# Patient Record
Sex: FEMALE | Race: WHITE | NOT HISPANIC OR LATINO | Employment: FULL TIME | URBAN - METROPOLITAN AREA
[De-identification: names, ages, dates, MRNs, and addresses within clinical notes are randomized per-mention and may not be internally consistent; named-entity substitution may affect disease eponyms.]

---

## 2017-01-16 LAB — HBA1C MFR BLD HPLC: 5.7 %

## 2018-04-12 DIAGNOSIS — E03.9 HYPOTHYROIDISM, UNSPECIFIED TYPE: Primary | ICD-10-CM

## 2018-04-12 RX ORDER — LEVOTHYROXINE SODIUM 175 MCG
175 TABLET ORAL DAILY
COMMUNITY
End: 2018-04-12 | Stop reason: SDUPTHER

## 2018-04-12 RX ORDER — LEVOTHYROXINE SODIUM 175 MCG
175 TABLET ORAL DAILY
Qty: 105 TABLET | Refills: 0 | Status: SHIPPED | OUTPATIENT
Start: 2018-04-12 | End: 2021-11-17 | Stop reason: SDUPTHER

## 2018-06-26 ENCOUNTER — TELEPHONE (OUTPATIENT)
Dept: ENDOCRINOLOGY | Facility: HOSPITAL | Age: 53
End: 2018-06-26

## 2018-06-26 NOTE — TELEPHONE ENCOUNTER
Called pt and advised we requested [place] chart from [facility] and put a rush on it  As soon as we have records we will fax to [address] [place] [address]

## 2018-06-26 NOTE — TELEPHONE ENCOUNTER
Pt called questioning if we received release of information from [address] [place] [address]  I advised as far as I could tell we did not receive  She said they faxed several weeks ago  Pt has appt 6/28/18  I called [facility] and left message asking them to fax again    [phone]

## 2018-06-26 NOTE — TELEPHONE ENCOUNTER
Called back to [address] [place] [address] advised that release was sent to central fax   Advised her to send to direct fax #

## 2020-07-01 LAB — EXT SARS-COV-2: NOT DETECTED

## 2021-07-20 ENCOUNTER — OFFICE VISIT (OUTPATIENT)
Dept: ENDOCRINOLOGY | Facility: HOSPITAL | Age: 56
End: 2021-07-20
Payer: COMMERCIAL

## 2021-07-20 VITALS
SYSTOLIC BLOOD PRESSURE: 126 MMHG | WEIGHT: 205.4 LBS | HEART RATE: 78 BPM | BODY MASS INDEX: 32.24 KG/M2 | HEIGHT: 67 IN | DIASTOLIC BLOOD PRESSURE: 82 MMHG

## 2021-07-20 DIAGNOSIS — E53.8 VITAMIN B12 DEFICIENCY: ICD-10-CM

## 2021-07-20 DIAGNOSIS — E55.9 VITAMIN D DEFICIENCY: ICD-10-CM

## 2021-07-20 DIAGNOSIS — E06.3 HYPOTHYROIDISM DUE TO HASHIMOTO'S THYROIDITIS: Primary | ICD-10-CM

## 2021-07-20 DIAGNOSIS — E03.8 HYPOTHYROIDISM DUE TO HASHIMOTO'S THYROIDITIS: Primary | ICD-10-CM

## 2021-07-20 DIAGNOSIS — E28.2 PCOS (POLYCYSTIC OVARIAN SYNDROME): ICD-10-CM

## 2021-07-20 DIAGNOSIS — E27.8 LEFT ADRENAL MASS (HCC): ICD-10-CM

## 2021-07-20 PROCEDURE — 99245 OFF/OP CONSLTJ NEW/EST HI 55: CPT | Performed by: INTERNAL MEDICINE

## 2021-07-20 RX ORDER — LEVONORGESTREL / ETHINYL ESTRADIOL AND ETHINYL ESTRADIOL 150-30(84)
KIT ORAL
COMMUNITY
Start: 2021-04-30 | End: 2022-05-19 | Stop reason: ALTCHOICE

## 2021-07-20 RX ORDER — DEXAMETHASONE 1 MG
TABLET ORAL
Qty: 1 TABLET | Refills: 0 | Status: SHIPPED | OUTPATIENT
Start: 2021-07-20 | End: 2021-11-17 | Stop reason: ALTCHOICE

## 2021-07-20 RX ORDER — FLUOXETINE HYDROCHLORIDE 40 MG/1
40 CAPSULE ORAL DAILY
COMMUNITY
Start: 2021-07-07

## 2021-07-20 NOTE — PROGRESS NOTES
7/21/2021    Assessment/Plan      Diagnoses and all orders for this visit:    Hypothyroidism due to Hashimoto's thyroiditis  -     TSH, 3rd generation Lab Collect; Future  -     T4, free Lab Collect; Future  -     Vitamin D 25 hydroxy Lab Collect; Future  -     Vitamin B12; Future  -     Catecholamines, fractionated, plasma; Future  -     Metanephrine, Fractionated Plasma Free; Future    PCOS (polycystic ovarian syndrome)  -     TSH, 3rd generation Lab Collect; Future  -     T4, free Lab Collect; Future  -     Vitamin D 25 hydroxy Lab Collect; Future  -     Vitamin B12; Future  -     Catecholamines, fractionated, plasma; Future  -     Metanephrine, Fractionated Plasma Free; Future    Vitamin D deficiency  -     TSH, 3rd generation Lab Collect; Future  -     T4, free Lab Collect; Future  -     Vitamin D 25 hydroxy Lab Collect; Future  -     Vitamin B12; Future  -     Catecholamines, fractionated, plasma; Future  -     Metanephrine, Fractionated Plasma Free; Future    Vitamin B12 deficiency  -     TSH, 3rd generation Lab Collect; Future  -     T4, free Lab Collect; Future  -     Vitamin D 25 hydroxy Lab Collect; Future  -     Vitamin B12; Future  -     Catecholamines, fractionated, plasma; Future  -     Metanephrine, Fractionated Plasma Free; Future    Left adrenal mass (HCC)  -     TSH, 3rd generation Lab Collect; Future  -     T4, free Lab Collect; Future  -     Vitamin D 25 hydroxy Lab Collect; Future  -     Vitamin B12; Future  -     Catecholamines, fractionated, plasma; Future  -     Metanephrine, Fractionated Plasma Free; Future  -     Cortisol Level, AM Specimen Lab Collect; Future  -     dexamethasone (DECADRON) 1 mg tablet; Take 1 mg at 10-11 pm the night before the fasting am cortisol test is drawn    Other orders  -     FLUoxetine (PROzac) 40 MG capsule; Take 40 mg by mouth daily  -     Levonorgest-Eth Estrad 91-Day 0 15-0 03 &0 01 MG TABS; 1 TABLET ONCE A DAY ORAL 365 DAY(S)        Assessment/Plan:  1  Hypothyroidism due to Hashimoto's thyroiditis  She recently had a decrease in TSH and thyroid hormone dosage was adjusted  Blood work was done just about 1 month after the dosage adjustment which is a bit too soon for the hypothalamic-pituitary -thyroid axis to recall a break  I would not adjust her Synthroid dosage at this point  She is due for thyroid hormone re-evaluation 6-8 weeks after her dosage was adjusted and that is within the next week or 2  I will repeat a TSH with free T4   In the future, I will repeat her thyroid antibody levels         2  Polycystic ovary syndrome  She is only using oral contraceptives  At some point, we could do a CMP with fasting insulin level to determine if metformin is an option  3  Left adrenal mass  Given her history of a left adrenal mass and struggles with weight, I will check an overnight dexamethasone suppression test to rule out cortisol excess and metanephrines and catecholamines to rule out pheochromocytoma  4  Vitamin-D deficiency  She has not had this checked recently and I will do a vitamin-D level  5  Vitamin B12 deficiency  She has not had this checked recently and I will do a vitamin  B12 level  She will get blood work done in the next week consisting of a TSH with free T4, catecholamines, metanephrines, vitamin B12 level, and 25 hydroxy vitamin-D level along with an overnight dexamethasone suppression test     Follow-up will be determined based on the blood work  CC:   Hypothyroid, PCOS, adrenal consult    History of Present Illness     HPI: Hanna Glez is a 55y o  year old female with history of  Hypothyroidism due to Hashimoto's thyroiditis, polycystic ovary syndrome, vitamin B12 deficiency, vitamin-D deficiency, left adrenal mass for evaluation/consult  She was diagnosed with hypothyroidism due to Hashimoto's thyroiditis in 2005 when her weight started to climb despite working out    She was started on thyroid hormone for replacement purposes and has been on brand-name thyroid hormone  She was last seen by me in a previous practice in 2017 and had been following with a different endocrinologist who is closer to home but has elected to follow-up back with me for transfer of care as she was not happy with that endocrinologist   She is currently taking Synthroid brand 175 mcg 1 tablet Monday through Saturday and half tablet on Sunday that was adjusted by her primary care physician near the end of May or early June 2021 for a decrease in TSH  At that time, she had felt awful, fatigue, somewhat confused in clumsy and falling and has improved some of these symptoms with the adjustment in dosage  She has a history of a left adrenal mass noted on CT scan in November 2015  The mass was 1 4 cm at -7  house field units  This is consistent with a benign adenoma and the only worry is hormonal hyper function and has been worked up in the past       She has a history of polycystic ovary syndrome diagnosed around 2008 and has been on oral contraceptive since  Her gynecologist has evaluated her intermittently for menopause and reportedly she has kamila menopausal   She has never been on metformin for PCOS  She has a history of vitamin-D deficiency and vitamin B12 deficiency which she was supplementing prior to 2017 but has not been supplementing at this point  She denies heat or cold intolerance but can get a cold sweat overnight which will leave her shaking  She denies palpitation or tremors  She has lost 50 lb with dieting and then COVID hit and she gained 25 lb back  She is now on weight watchers working on getting her weight down again  She has diarrhea when walking more than 3 miles a day  She denies constipation, anxiety or depression, or insomnia  She is in general under lot of stress  She denies dry skin, brittle nails, or hair loss  She is fatigued  Menstrual cycles are absent on her oral contraceptives for years  She denies diplopia  She will choke on small pieces of food when they were caught the left side of her throat at least once a day  This has been worked up in the past   She has no history of head or neck irradiation in the past       She has some hunger and could always eat and has once or twice a night nocturia  She drinks a lot of water for health reasons but has no polydipsia or polyuria  She denies numbness or tingling of the feet or periorally  She denies chest pain or shortness of breath  She has some blurry vision and diplopia and got new prisms in her glasses that of improve this situation  She has no paroxysmal spells  She has no violaceous striae  She does have some hirsutism on her chin, chest, and abdomen but no acne  She has no male pattern hair loss  Review of Systems   Constitutional: Positive for fatigue and unexpected weight change  No paroxsymal spells  HENT: Positive for tinnitus and trouble swallowing  Negative for hearing loss  Chokes on small food pieces on left side of the throat daily at least once daily and has to vomit it up  Has had swallowing studies done and no help  Chronic tinntitus  NO XRt to the head or neck I nthe apst ,   Eyes: Positive for visual disturbance  Has blurry vision, got new prism lens  Had diplopia  Respiratory: Negative for chest tightness and shortness of breath  Cardiovascular: Positive for leg swelling  Negative for chest pain and palpitations  Occasional feet/ankle edema on hot and humid days  Gastrointestinal: Positive for diarrhea  Negative for abdominal pain, constipation and nausea  Will get diarrhea when trying to walk 3 miles or more at a time  Does a 6 mile walk regularly  Endocrine: Positive for polyphagia  Negative for cold intolerance, heat intolerance, polydipsia and polyuria  Will get a cold sweat overnight that leaves her shaking  Nocturia 1-2 times a night   Drinks a lot of water for health reasons  Some hunger and can always eat  Genitourinary:        No menses in years even on OCP  Musculoskeletal: Negative for arthralgias and back pain  Skin: Negative for rash and wound  No dry skin  No brittle nails  Has hair loss generalized  No male pattern hair loss  Hirsutism on chin and mid chest and abdomen  No change  No acne  No violaceous striae  Neurological: Positive for headaches  Negative for dizziness, tremors, light-headedness and numbness  Frequent headaches  No perioral paresthesias  Psychiatric/Behavioral: Negative for dysphoric mood and sleep disturbance  The patient is not nervous/anxious  Can sleep 10-12 hours a night  Under a lot of stress, a caregiver for elderly parents         Historical Information   Past Medical History:   Diagnosis Date    Colon polyps     Depression     Exercise-induced asthma     with aggressive work out     Past Surgical History:   Procedure Laterality Date     SECTION      X 2    LAMINECTOMY      L4-5    LAPAROSCOPIC CHOLECYSTECTOMY      TUBAL LIGATION Bilateral      Social History   Social History     Substance and Sexual Activity   Alcohol Use Never     Social History     Substance and Sexual Activity   Drug Use Never     Social History     Tobacco Use   Smoking Status Former Smoker    Types: Cigarettes    Quit date:     Years since quittin 5   Smokeless Tobacco Never Used     Family History:   Family History   Problem Relation Age of Onset    Hypertension Mother     Hypertension Father     Leukemia Father     Diabetes type II Father     Hypothyroidism Father     Colon polyps Sister     Lung disease Sister     Colon polyps Sister     Diabetes type I Son     Tourette syndrome Son     No Known Problems Son        Meds/Allergies   Current Outpatient Medications   Medication Sig Dispense Refill    FLUoxetine (PROzac) 40 MG capsule Take 40 mg by mouth daily      Levonorgest-Eth Estrad 91-Day 0  15-0 03 &0 01 MG TABS 1 TABLET ONCE A DAY ORAL 365 DAY(S)      SYNTHROID 175 MCG tablet Take 1 tablet (175 mcg total) by mouth daily 1 TABLET Monday THROUGH Friday AND 1 1/2 TABLET ON Sunday AND Saturday (Patient taking differently: Take 175 mcg by mouth daily 1 TABLET Monday THROUGH Saturday AND 1/2 TABLET ON Sunday) 105 tablet 0    dexamethasone (DECADRON) 1 mg tablet Take 1 mg at 10-11 pm the night before the fasting am cortisol test is drawn 1 tablet 0     No current facility-administered medications for this visit  Allergies   Allergen Reactions    Iodine - Food Allergy Vomiting     And sweats     Sulfa Antibiotics Rash       Objective   Vitals: Blood pressure 126/82, pulse 78, height 5' 7" (1 702 m), weight 93 2 kg (205 lb 6 4 oz)  Invasive Devices     None                 Physical Exam  Vitals reviewed  Constitutional:       Appearance: Normal appearance  She is well-developed  She is obese  HENT:      Head: Normocephalic and atraumatic  Comments: Negative Chvostek sign  No moon faces  Eyes:      Extraocular Movements: Extraocular movements intact  Conjunctiva/sclera: Conjunctivae normal       Comments: No lid lag, stare, proptosis, or periorbital edema  Neck:      Thyroid: No thyromegaly  Vascular: No carotid bruit  Comments: Slight supraclavicular fat pads and buffalo hump  Thyroid normal in size  No palpable thyroid nodules  No bruits over the thyroid gland  Cardiovascular:      Rate and Rhythm: Normal rate and regular rhythm  Heart sounds: Normal heart sounds  No murmur heard  Pulmonary:      Effort: Pulmonary effort is normal       Breath sounds: Normal breath sounds  No wheezing  Abdominal:      General: Bowel sounds are normal       Palpations: Abdomen is soft  Tenderness: There is no abdominal tenderness  Musculoskeletal:         General: No deformity  Normal range of motion  Cervical back: Normal range of motion and neck supple  Right lower leg: No edema  Left lower leg: No edema  Comments: No tremor of the outstretched hands  No spinous process tenderness  No CVA tenderness  Lymphadenopathy:      Cervical: No cervical adenopathy  Skin:     General: Skin is warm and dry  Findings: No rash  Comments: No violaceous striae  Neurological:      Mental Status: She is alert and oriented to person, place, and time  Deep Tendon Reflexes: Reflexes are normal and symmetric  Comments: Deep tendon reflexes normal          The history was obtained from the review of the chart and from the patient  Lab Results:      Blood work done on 05/21/2021 showed a TSH of 0 13 with a free T4 of 1 5  Blood work done on 07/02/2021 showed a TSH of 0 3 with a free T4 of 1 5  CMP showed a glucose of 82 but was otherwise normal   CBC was normal       Total cholesterol 180, triglyceride 185, HDL 38,   No future appointments

## 2021-07-20 NOTE — PATIENT INSTRUCTIONS
The last blood work was done a bit too soon after thyroid hormone dose adjusted  It will be due next week and we can check vitamin D and vitamin B12 with adrenaline hormones next week  Then we can rule out excess cortisol with an overnight dexamethasone suppression test: take dexamethasone 1 mg at 10-11pm and then get am fasting cortisol done between 7-9 am the next morning  Then we can decide on any changes, for now, continue the same synthroid  Look up copay card for synthroid on [employer] website  Follow up based on the blood work

## 2021-08-03 LAB
25(OH)D3 SERPL-MCNC: 26 NG/ML (ref 30–100)
CATECHOLS PLAS-MCNC: 202 PG/ML
CORTIS AM PEAK SERPL-MCNC: 1.2 MCG/DL
DOPAMINE 24H UR-MRATE: <10 PG/ML
EPINEPH PLAS-MCNC: <20 PG/ML
METANEPH FREE SERPL-MCNC: <25 PG/ML
METANEPHS SERPL-MCNC: 35 PG/ML
NOREPINEPH PLAS-MCNC: 202 PG/ML
NORMETANEPH FREE SERPL-MCNC: 35 PG/ML
T4 FREE SERPL-MCNC: 1.4 NG/DL (ref 0.8–1.8)
TSH SERPL-ACNC: 0.06 MIU/L
VIT B12 SERPL-MCNC: 268 PG/ML (ref 200–1100)

## 2021-08-09 ENCOUNTER — TELEPHONE (OUTPATIENT)
Dept: ENDOCRINOLOGY | Facility: HOSPITAL | Age: 56
End: 2021-08-09

## 2021-08-09 DIAGNOSIS — E03.8 HYPOTHYROIDISM DUE TO HASHIMOTO'S THYROIDITIS: Primary | ICD-10-CM

## 2021-08-09 DIAGNOSIS — E06.3 HYPOTHYROIDISM DUE TO HASHIMOTO'S THYROIDITIS: Primary | ICD-10-CM

## 2021-08-09 NOTE — TELEPHONE ENCOUNTER
Spoke with patient  She is questioning the adjustment in her Synthroid 175mcg tablet  She is currently taking Synthroid 175mcg 1 tablet 6 days a week and half a tablet 1 day a week  She expresses concern of fatigue and that her levels keep decreasing  Please advise

## 2021-08-09 NOTE — TELEPHONE ENCOUNTER
Sorry, I was thinking she was taking 175 every day and extra 1/2 once a week  She should decrease to 175 mcg 6 days a week and none on the 7th day

## 2021-08-09 NOTE — TELEPHONE ENCOUNTER
The blood work shows that the thyroid is overactive so I would have her decrease her Synthroid to 175 mcg 1 tablet every day and cut out that extra half tablet once a week  Her vitamin-D level is low and she should start vitamin-D 1000 units daily  Her vitamin B12 level is low normal so she could restart her vitamin B12 1000 mcg daily  The blood work for adrenaline hormones is normal or below normal and we are worried about high levels with her adrenal mass  Her overnight dexamethasone suppression test showed that her cortisol was low signifying no excess of cortisol being produced from the tumor  I would then have her follow up in 3 months with preceding TSH, free T4, and 25 hydroxy vitamin-D level

## 2021-10-06 ENCOUNTER — TELEPHONE (OUTPATIENT)
Dept: ENDOCRINOLOGY | Facility: HOSPITAL | Age: 56
End: 2021-10-06

## 2021-10-06 DIAGNOSIS — E06.3 HYPOTHYROIDISM DUE TO HASHIMOTO'S THYROIDITIS: ICD-10-CM

## 2021-10-06 DIAGNOSIS — R13.19 OTHER DYSPHAGIA: Primary | ICD-10-CM

## 2021-10-06 DIAGNOSIS — E03.8 HYPOTHYROIDISM DUE TO HASHIMOTO'S THYROIDITIS: ICD-10-CM

## 2021-10-12 ENCOUNTER — HOSPITAL ENCOUNTER (OUTPATIENT)
Dept: ULTRASOUND IMAGING | Facility: HOSPITAL | Age: 56
Discharge: HOME | End: 2021-10-12
Attending: INTERNAL MEDICINE
Payer: COMMERCIAL

## 2021-10-12 DIAGNOSIS — E03.8 HYPOTHYROIDISM DUE TO HASHIMOTO'S THYROIDITIS: ICD-10-CM

## 2021-10-12 DIAGNOSIS — E06.3 HYPOTHYROIDISM DUE TO HASHIMOTO'S THYROIDITIS: ICD-10-CM

## 2021-10-12 DIAGNOSIS — R13.19 OTHER DYSPHAGIA: ICD-10-CM

## 2021-10-12 PROCEDURE — 76536 US EXAM OF HEAD AND NECK: CPT

## 2021-11-04 LAB
25(OH)D3 SERPL-MCNC: 28 NG/ML (ref 30–100)
T4 FREE SERPL-MCNC: 1.5 NG/DL (ref 0.8–1.8)
TSH SERPL-ACNC: 0.37 MIU/L

## 2021-11-17 ENCOUNTER — OFFICE VISIT (OUTPATIENT)
Dept: ENDOCRINOLOGY | Facility: HOSPITAL | Age: 56
End: 2021-11-17
Payer: COMMERCIAL

## 2021-11-17 VITALS
HEART RATE: 82 BPM | SYSTOLIC BLOOD PRESSURE: 122 MMHG | DIASTOLIC BLOOD PRESSURE: 78 MMHG | WEIGHT: 205.8 LBS | HEIGHT: 67 IN | BODY MASS INDEX: 32.3 KG/M2

## 2021-11-17 DIAGNOSIS — E55.9 VITAMIN D DEFICIENCY: ICD-10-CM

## 2021-11-17 DIAGNOSIS — E06.3 HYPOTHYROIDISM DUE TO HASHIMOTO'S THYROIDITIS: Primary | ICD-10-CM

## 2021-11-17 DIAGNOSIS — E53.8 VITAMIN B12 DEFICIENCY: ICD-10-CM

## 2021-11-17 DIAGNOSIS — E03.9 HYPOTHYROIDISM, UNSPECIFIED TYPE: ICD-10-CM

## 2021-11-17 DIAGNOSIS — E28.2 PCOS (POLYCYSTIC OVARIAN SYNDROME): ICD-10-CM

## 2021-11-17 DIAGNOSIS — E03.8 HYPOTHYROIDISM DUE TO HASHIMOTO'S THYROIDITIS: Primary | ICD-10-CM

## 2021-11-17 DIAGNOSIS — E27.8 LEFT ADRENAL MASS (HCC): ICD-10-CM

## 2021-11-17 PROCEDURE — 99214 OFFICE O/P EST MOD 30 MIN: CPT | Performed by: INTERNAL MEDICINE

## 2021-11-17 RX ORDER — LEVOTHYROXINE SODIUM 175 MCG
175 TABLET ORAL DAILY
Qty: 90 TABLET | Refills: 3 | Status: SHIPPED | OUTPATIENT
Start: 2021-11-17 | End: 2022-05-19 | Stop reason: DRUGHIGH

## 2021-11-17 RX ORDER — LANOLIN ALCOHOL/MO/W.PET/CERES
CREAM (GRAM) TOPICAL DAILY
COMMUNITY

## 2021-11-17 RX ORDER — MELATONIN
2000 DAILY
Start: 2021-11-17

## 2021-11-17 RX ORDER — MELATONIN
1000 DAILY
COMMUNITY
End: 2021-11-17 | Stop reason: SDUPTHER

## 2022-05-19 ENCOUNTER — OFFICE VISIT (OUTPATIENT)
Dept: ENDOCRINOLOGY | Facility: HOSPITAL | Age: 57
End: 2022-05-19
Payer: COMMERCIAL

## 2022-05-19 VITALS
SYSTOLIC BLOOD PRESSURE: 122 MMHG | BODY MASS INDEX: 32.62 KG/M2 | HEART RATE: 84 BPM | HEIGHT: 67 IN | WEIGHT: 207.8 LBS | DIASTOLIC BLOOD PRESSURE: 80 MMHG

## 2022-05-19 DIAGNOSIS — E03.8 HYPOTHYROIDISM DUE TO HASHIMOTO'S THYROIDITIS: Primary | ICD-10-CM

## 2022-05-19 DIAGNOSIS — E28.2 PCOS (POLYCYSTIC OVARIAN SYNDROME): ICD-10-CM

## 2022-05-19 DIAGNOSIS — E27.8 LEFT ADRENAL MASS (HCC): ICD-10-CM

## 2022-05-19 DIAGNOSIS — E55.9 VITAMIN D DEFICIENCY: ICD-10-CM

## 2022-05-19 DIAGNOSIS — E53.8 VITAMIN B12 DEFICIENCY: ICD-10-CM

## 2022-05-19 DIAGNOSIS — E06.3 HYPOTHYROIDISM DUE TO HASHIMOTO'S THYROIDITIS: Primary | ICD-10-CM

## 2022-05-19 PROCEDURE — 99214 OFFICE O/P EST MOD 30 MIN: CPT | Performed by: INTERNAL MEDICINE

## 2022-05-19 RX ORDER — LEVOTHYROXINE SODIUM 0.15 MG/1
TABLET ORAL
Qty: 90 TABLET | Refills: 3 | Status: SHIPPED | OUTPATIENT
Start: 2022-05-19

## 2022-05-19 NOTE — PROGRESS NOTES
5/19/2022    Assessment/Plan      Diagnoses and all orders for this visit:    Hypothyroidism due to Hashimoto's thyroiditis  -     levothyroxine (Synthroid) 150 mcg tablet; Take 1 tablet 6 days a week  -     Comprehensive metabolic panel Lab Collect; Future  -     HEMOGLOBIN A1C W/ EAG ESTIMATION Lab Collect; Future  -     TSH, 3rd generation Lab Collect; Future  -     T4, free Lab Collect; Future  -     Vitamin D 25 hydroxy Lab Collect; Future  -     T3, free; Future  -     Insulin, fasting- Lab Collect; Future    PCOS (polycystic ovarian syndrome)  -     Comprehensive metabolic panel Lab Collect; Future  -     HEMOGLOBIN A1C W/ EAG ESTIMATION Lab Collect; Future  -     TSH, 3rd generation Lab Collect; Future  -     T4, free Lab Collect; Future  -     Vitamin D 25 hydroxy Lab Collect; Future  -     T3, free; Future  -     Insulin, fasting- Lab Collect; Future    Left adrenal mass (HCC)  -     Comprehensive metabolic panel Lab Collect; Future  -     HEMOGLOBIN A1C W/ EAG ESTIMATION Lab Collect; Future  -     TSH, 3rd generation Lab Collect; Future  -     T4, free Lab Collect; Future  -     Vitamin D 25 hydroxy Lab Collect; Future  -     T3, free; Future  -     Insulin, fasting- Lab Collect; Future    Vitamin B12 deficiency  -     Comprehensive metabolic panel Lab Collect; Future  -     HEMOGLOBIN A1C W/ EAG ESTIMATION Lab Collect; Future  -     TSH, 3rd generation Lab Collect; Future  -     T4, free Lab Collect; Future  -     Vitamin D 25 hydroxy Lab Collect; Future  -     T3, free; Future  -     Insulin, fasting- Lab Collect; Future    Vitamin D deficiency  -     Comprehensive metabolic panel Lab Collect; Future  -     HEMOGLOBIN A1C W/ EAG ESTIMATION Lab Collect; Future  -     TSH, 3rd generation Lab Collect; Future  -     T4, free Lab Collect; Future  -     Vitamin D 25 hydroxy Lab Collect; Future  -     T3, free; Future  -     Insulin, fasting- Lab Collect; Future        Assessment/Plan:  1   Hypothyroidism due to Hashimoto's thyroiditis  Most recent thyroid function tests do show a suppressed TSH  At this point, she is biochemically hyperthyroid and I have asked her to decrease her Synthroid to 150 mcg 1 tablet 6 days a week  2  Polycystic ovary syndrome  She is struggling with weight loss and is afraid of decreasing her thyroid hormone and that will cause weight loss  I will check an insulin level with her next visit to see if her insulin resistance has increased we could consider metformin usage or even a GLP 1 inhibitor such as Ozempic or Trulicity  She will continue to work on carbohydrate restricted diet weight watchers  3  Left adrenal mass  This has been ruled out for a adrenal hyperfunction  4  Vitamin B12 deficiency  Most recent vitamin B12 level is normal       5  Vitamin-D deficiency  She will continue same vitamin-D 2000 units daily  I will check a vitamin-D level with her next blood work  I have asked her to get blood work in 3 months consisting of a fasting CMP with insulin level, hemoglobin A1c, 25 hydroxy vitamin-D level, TSH, free T4, and free T3  We will determine any medication changes and whether to add something for insulin resistance based on this blood work  I have asked her to follow up in 6 months and I will order that blood work after her blood work is completed in 3 months  CC:  Hypothyroid, PCOS, adrenal mass, vitamin B12 and vitamin-D deficiency follow-up    History of Present Illness     HPI: Abdulaziz Higgins is a 65y o  year old female with history of hypothyroidism due to Hashimoto's thyroiditis, polycystic ovary syndrome, left adrenal mass, vitamin-D deficiency, vitamin B12 deficiency for follow-up visit  She was diagnosed with hypothyroidism due to Hashimoto's thyroiditis in 2005 when her weight started to climb despite working out  She has been on thyroid hormone for replacement purposes ever since    She will follow same Synthroid 175 mcg 1 tablet 6 days a week  She denies heat or cold intolerance, palpitation, tremors, or weight loss  She has chronic fatigue  She denies diarrhea or constipation, anxiety or depression, or insomnia  She has unchanged dry skin but no brittle nails  She does have hair loss  She denies diplopia  She has a history of left adrenal mass noted on CT scan in November 2015  This was consistent with benign adenoma  Overnight dexamethasone suppression test in catecholamines were normal in summer 2021 to rule out hyperfunctioning of her adrenal mass  She has a history of polycystic ovary syndrome diagnosed around 2008  She had been using oral contraceptives for this problem  She stopped OCP due to age about December 2021  No menses since  She has polyuria, polyphagia, and polydipsia  She has nocturia at least once a night up to 2-3 times a night  She has hirsutism on her chin unchanged  She has been struggling with weight loss despite utilizing weight watchers and has not been able to lose weight  She has vitamin-D deficiency and takes vitamin-D 2000 units daily  She denies extremity paresthesias  She has vitamin B12 deficiency  She takes vitamin B12 1000 mcg daily  She is always fatigued  Review of Systems   Constitutional: Positive for fatigue  Negative for unexpected weight change  HENT: Positive for trouble swallowing  Still small pieces of food get stuck and refugio and cough them up  Eyes: Negative for visual disturbance  Wears glasses  diplopia helped with prism in her glasses  Respiratory: Negative for chest tightness and shortness of breath  Cardiovascular: Negative for chest pain and palpitations  Gastrointestinal: Negative for abdominal pain, constipation, diarrhea and nausea  Bowels softer and "like paste" and "stuck"  Endocrine: Positive for polydipsia, polyphagia and polyuria  Negative for cold intolerance and heat intolerance          Polyuria as drinking a lot  Some thirst  Nocturia at least once a night, up to 2-3 times a night  Always hungry  Genitourinary:        No menses off OCP  Skin: Negative for rash  Has unchanged dry skin  No brittle nails  Has hair loss  hirsutism on the chin unchanged  Neurological: Negative for dizziness, tremors, light-headedness, numbness and headaches  Psychiatric/Behavioral: Negative for dysphoric mood and sleep disturbance  The patient is not nervous/anxious          Historical Information   Past Medical History:   Diagnosis Date    Colon polyps     Depression     Exercise-induced asthma     with aggressive work out     Past Surgical History:   Procedure Laterality Date     SECTION      X 2    LAMINECTOMY      L4-5    LAPAROSCOPIC CHOLECYSTECTOMY      TUBAL LIGATION Bilateral      Social History   Social History     Substance and Sexual Activity   Alcohol Use Never     Social History     Substance and Sexual Activity   Drug Use Never     Social History     Tobacco Use   Smoking Status Former Smoker    Types: Cigarettes    Quit date:     Years since quittin 3   Smokeless Tobacco Never Used     Family History:   Family History   Problem Relation Age of Onset    Hypertension Mother     Hypertension Father     Leukemia Father     Diabetes type II Father     Hypothyroidism Father     Colon polyps Sister     Lung disease Sister     Colon polyps Sister     Diabetes type I Son     Tourette syndrome Son     No Known Problems Son        Meds/Allergies   Current Outpatient Medications   Medication Sig Dispense Refill    cholecalciferol (VITAMIN D3) 1,000 units tablet Take 2 tablets (2,000 Units total) by mouth daily      FLUoxetine (PROzac) 40 MG capsule Take 40 mg by mouth daily      levothyroxine (Synthroid) 150 mcg tablet Take 1 tablet 6 days a week 90 tablet 3    vitamin B-12 (VITAMIN B-12) 1,000 mcg tablet Take by mouth daily       No current facility-administered medications for this visit  Allergies   Allergen Reactions    Iodine - Food Allergy Vomiting     And sweats     Sulfa Antibiotics Rash       Objective   Vitals: Blood pressure 122/80, pulse 84, height 5' 7" (1 702 m), weight 94 3 kg (207 lb 12 8 oz)  Invasive Devices  Report    None                 Physical Exam  Vitals reviewed  Constitutional:       Appearance: Normal appearance  She is well-developed  She is obese  HENT:      Head: Normocephalic and atraumatic  Eyes:      Extraocular Movements: Extraocular movements intact  Conjunctiva/sclera: Conjunctivae normal       Comments: No lid lag, stare, proptosis, or periorbital edema  Neck:      Thyroid: No thyromegaly  Vascular: No carotid bruit  Comments: Thyroid normal in size  No palpable thyroid nodules  Cardiovascular:      Rate and Rhythm: Normal rate and regular rhythm  Heart sounds: Normal heart sounds  No murmur heard  Pulmonary:      Effort: Pulmonary effort is normal       Breath sounds: Normal breath sounds  No wheezing  Abdominal:      Palpations: Abdomen is soft  Musculoskeletal:         General: No deformity  Normal range of motion  Cervical back: Normal range of motion and neck supple  Right lower leg: No edema  Left lower leg: No edema  Comments: No tremor of the outstretched hands  No CVA tenderness  Lymphadenopathy:      Cervical: No cervical adenopathy  Skin:     General: Skin is warm and dry  Findings: No rash  Neurological:      Mental Status: She is alert and oriented to person, place, and time  Deep Tendon Reflexes: Reflexes are normal and symmetric  Comments: Patellar deep tendon reflexes normal          The history was obtained from the review of the chart and from the patient  Lab Results:    Blood work done on 04/28/2022 showed a TSH of 0 02 with a free T4 1 6  Vitamin B12 level is 1062  Blood work done on 04/25/2022 showed a TSH of 0 03    CMP showed a glucose of 90 but was otherwise normal   CBC is normal         Lab Results   Component Value Date    TSH 0 37 (L) 11/03/2021    FREET4 1 5 11/03/2021             Future Appointments   Date Time Provider Mandy Villalba   11/22/2022  2:40 PM Yasir Murphy MD [facility]

## 2022-05-19 NOTE — PATIENT INSTRUCTIONS
The thyroid is overactive  I expect due to stopping the birth control pills  Let's decrease the synthroid to 150 mcg 6 days a week  Follow up in 6 months with blood work

## 2022-08-20 LAB
25(OH)D3 SERPL-MCNC: 38 NG/ML (ref 30–100)
ALBUMIN SERPL-MCNC: 4.1 G/DL (ref 3.6–5.1)
ALBUMIN/GLOB SERPL: 1.5 (CALC) (ref 1–2.5)
ALP SERPL-CCNC: 100 U/L (ref 37–153)
ALT SERPL-CCNC: 23 U/L (ref 6–29)
AST SERPL-CCNC: 16 U/L (ref 10–35)
BILIRUB SERPL-MCNC: 0.4 MG/DL (ref 0.2–1.2)
BUN SERPL-MCNC: 15 MG/DL (ref 7–25)
BUN/CREAT SERPL: NORMAL (CALC) (ref 6–22)
CALCIUM SERPL-MCNC: 9.6 MG/DL (ref 8.6–10.4)
CHLORIDE SERPL-SCNC: 105 MMOL/L (ref 98–110)
CO2 SERPL-SCNC: 28 MMOL/L (ref 20–32)
CREAT SERPL-MCNC: 0.66 MG/DL (ref 0.5–1.03)
GFR/BSA.PRED SERPLBLD CYS-BASED-ARV: 103 ML/MIN/1.73M2
GLOBULIN SER CALC-MCNC: 2.8 G/DL (CALC) (ref 1.9–3.7)
GLUCOSE SERPL-MCNC: 93 MG/DL (ref 65–99)
HBA1C MFR BLD: 5.4 % OF TOTAL HGB
INSULIN SERPL-ACNC: 19.5 UIU/ML
POTASSIUM SERPL-SCNC: 5.3 MMOL/L (ref 3.5–5.3)
PROT SERPL-MCNC: 6.9 G/DL (ref 6.1–8.1)
SODIUM SERPL-SCNC: 141 MMOL/L (ref 135–146)
T3FREE SERPL-MCNC: 2.9 PG/ML (ref 2.3–4.2)
T4 FREE SERPL-MCNC: 1.4 NG/DL (ref 0.8–1.8)
TSH SERPL-ACNC: 0.16 MIU/L (ref 0.4–4.5)

## 2022-09-30 ENCOUNTER — PATIENT MESSAGE (OUTPATIENT)
Dept: ENDOCRINOLOGY | Facility: HOSPITAL | Age: 57
End: 2022-09-30

## 2022-09-30 DIAGNOSIS — E06.3 HYPOTHYROIDISM DUE TO HASHIMOTO'S THYROIDITIS: Primary | ICD-10-CM

## 2022-09-30 DIAGNOSIS — E03.8 HYPOTHYROIDISM DUE TO HASHIMOTO'S THYROIDITIS: Primary | ICD-10-CM

## 2022-09-30 DIAGNOSIS — E28.2 PCOS (POLYCYSTIC OVARIAN SYNDROME): ICD-10-CM

## 2022-11-18 DIAGNOSIS — E06.3 HYPOTHYROIDISM DUE TO HASHIMOTO'S THYROIDITIS: ICD-10-CM

## 2022-11-18 DIAGNOSIS — E03.8 HYPOTHYROIDISM DUE TO HASHIMOTO'S THYROIDITIS: ICD-10-CM

## 2022-11-18 RX ORDER — LEVOTHYROXINE SODIUM 0.15 MG/1
TABLET ORAL
Qty: 30 TABLET | Refills: 0 | Status: SHIPPED | OUTPATIENT
Start: 2022-11-18 | End: 2022-11-18 | Stop reason: SDUPTHER

## 2022-11-18 RX ORDER — LEVOTHYROXINE SODIUM 150 MCG
TABLET ORAL
Qty: 30 TABLET | Refills: 0 | Status: SHIPPED | OUTPATIENT
Start: 2022-11-18 | End: 2022-11-29

## 2022-11-29 ENCOUNTER — OFFICE VISIT (OUTPATIENT)
Dept: ENDOCRINOLOGY | Facility: HOSPITAL | Age: 57
End: 2022-11-29

## 2022-11-29 VITALS
HEART RATE: 72 BPM | OXYGEN SATURATION: 97 % | HEIGHT: 67 IN | SYSTOLIC BLOOD PRESSURE: 118 MMHG | DIASTOLIC BLOOD PRESSURE: 80 MMHG | WEIGHT: 212 LBS | BODY MASS INDEX: 33.27 KG/M2

## 2022-11-29 DIAGNOSIS — E06.3 HYPOTHYROIDISM DUE TO HASHIMOTO'S THYROIDITIS: Primary | ICD-10-CM

## 2022-11-29 DIAGNOSIS — E55.9 VITAMIN D DEFICIENCY: ICD-10-CM

## 2022-11-29 DIAGNOSIS — E53.8 VITAMIN B12 DEFICIENCY: ICD-10-CM

## 2022-11-29 DIAGNOSIS — E03.8 HYPOTHYROIDISM DUE TO HASHIMOTO'S THYROIDITIS: Primary | ICD-10-CM

## 2022-11-29 DIAGNOSIS — E28.2 PCOS (POLYCYSTIC OVARIAN SYNDROME): ICD-10-CM

## 2022-11-29 RX ORDER — LEVOTHYROXINE SODIUM 137 UG/1
TABLET ORAL
Qty: 90 TABLET | Refills: 2 | Status: SHIPPED | OUTPATIENT
Start: 2022-11-29

## 2022-11-29 RX ORDER — FEXOFENADINE HCL 180 MG/1
180 TABLET ORAL DAILY
COMMUNITY

## 2022-11-29 NOTE — PROGRESS NOTES
Karyn Carson 65 y o  female MRN: [id number]    Encounter: [id number]      Assessment/Plan     Assessment: This is a 65y o -year-old female with hypothyroidism, polycystic ovarian syndrome, left adrenal mass  Plan:  1  Hypothyroidism due to Hashimoto's thyroiditis:  Most recent thyroid lab work came back normal   Clinically and biochemically euthyroid  At this time will change her dose to Synthroid 137 mcg 1 tablet 6 days a week and no tablet on Sunday  Contact the office if there is any change in symptoms  Follow-up in 6 months with lab work completed prior to visit  2  Polycystic ovarian syndrome:  Is trying dietary changes and increased physical activity to help with weight loss  Once again did discuss utilizing metformin extended release, or GLP 1 agonist to help with weight loss  At this time she does not want to start medication  Will repeat insulin levels prior to next office visit  3  Left adrenal mass:  Adrenal hyperfunction was ruled out in the past     4  Vitamin B12 deficiency:  Most recent vitamin B12 levels were normal   Will repeat levels prior to next office visit  5  Vitamin-D deficiency:  Vitamin-D levels were normal   Continue with vitamin-D 2000 units daily  CC:  Hypothyroidism, PCOS, adrenal mass follow-up    History of Present Illness     HPI:  Karyn Carson is a 65year old female with hypothyroidism due to Hashimoto's thyroiditis, polycystic ovary syndrome, left adrenal mass, vitamin-D deficiency, vitamin B12 deficiency for follow-up visit      She was diagnosed with hypothyroidism due to Hashimoto's thyroiditis in 2005 when her weight started to climb despite working out  She has been on thyroid hormone for replacement purposes ever since  She will follow same Synthroid 150 mcg 1 tablet 5 days a week, half tablet on Saturday and no tablet on Sunday  She denies heat or cold intolerance, palpitation, tremors, or weight loss  She has chronic fatigue    She denies diarrhea or constipation, anxiety or depression, or insomnia  She has unchanged dry skin but no brittle nails  She does have hair loss  She denies diplopia  Recently has been following up with an allergist due to episodes of angioedema and urticaria  Unclear what is the cause of these episodes         She has a history of left adrenal mass noted on CT scan in November 2015  This was consistent with benign adenoma  Overnight dexamethasone suppression test and catecholamines were normal in summer 2021 to rule out hyperfunctioning of her adrenal mass      She has a history of polycystic ovary syndrome diagnosed around 2008  She had been using oral contraceptives for this problem  She stopped OCP due to age about December 2021  No menses since  She has polyuria, polyphagia, and polydipsia  She has hirsutism on her chin unchanged  She has been struggling with weight loss despite utilizing weight watchers and has not been able to lose weight  Previously on metformin, but had severe GI side effects  Is increasing physical activity and doing weight watchers to try and help with weight loss         She has vitamin-D deficiency and takes vitamin-D 2000 units daily  She denies extremity paresthesias      She has vitamin B12 deficiency  She takes vitamin B12 1000 mcg daily  She is always fatigued  Review of Systems   Constitutional: Positive for fatigue  Negative for activity change, appetite change, diaphoresis and unexpected weight change  Difficulty losing weight   HENT: Positive for facial swelling  Negative for sore throat, trouble swallowing and voice change  Eyes: Negative for visual disturbance  Respiratory: Negative for chest tightness and shortness of breath  Cardiovascular: Negative for chest pain, palpitations and leg swelling  Gastrointestinal: Negative for abdominal pain, constipation and diarrhea     Endocrine: Negative for cold intolerance, heat intolerance, polydipsia, polyphagia and polyuria  Skin: Negative for rash  Dry skin and hair loss  Neurological: Negative for dizziness, tremors, light-headedness, numbness and headaches  Hematological: Negative for adenopathy  Psychiatric/Behavioral: Negative for dysphoric mood and sleep disturbance  The patient is not nervous/anxious          Historical Information   Past Medical History:   Diagnosis Date   • Colon polyps    • Depression    • Exercise-induced asthma     with aggressive work out     Past Surgical History:   Procedure Laterality Date   •  SECTION      X 2   • LAMINECTOMY      L4-5   • LAPAROSCOPIC CHOLECYSTECTOMY     • TUBAL LIGATION Bilateral      Social History   Social History     Substance and Sexual Activity   Alcohol Use Never     Social History     Substance and Sexual Activity   Drug Use Never     Social History     Tobacco Use   Smoking Status Former   • Packs/day:    • Years: 15 00   • Pack years: 15 00   • Types: Cigarettes   • Quit date: 2004   • Years since quittin 9   Smokeless Tobacco Never     Family History:   Family History   Problem Relation Age of Onset   • Hypertension Mother    • Hypertension Father    • Leukemia Father    • Diabetes type II Father    • Hypothyroidism Father    • Colon polyps Sister    • Lung disease Sister    • Colon polyps Sister    • Diabetes type I Son    • Tourette syndrome Son    • No Known Problems Son        Meds/Allergies   Current Outpatient Medications   Medication Sig Dispense Refill   • cholecalciferol (VITAMIN D3) 1,000 units tablet Take 2 tablets (2,000 Units total) by mouth daily     • fexofenadine (ALLEGRA) 180 MG tablet Take 180 mg by mouth daily     • FLUoxetine (PROzac) 40 MG capsule Take 40 mg by mouth daily     • levothyroxine (Synthroid) 137 mcg tablet 1 tab 6 days a week and none on  90 tablet 2   • vitamin B-12 (VITAMIN B-12) 1,000 mcg tablet Take by mouth daily       No current facility-administered medications for this visit  Allergies   Allergen Reactions   • Iodine - Food Allergy Vomiting     And sweats    • Sulfa Antibiotics Rash       Objective   Vitals: Blood pressure 118/80, pulse 72, height 5' 7" (1 702 m), weight 96 2 kg (212 lb), SpO2 97 %  Physical Exam  Vitals and nursing note reviewed  Constitutional:       General: She is not in acute distress  Appearance: Normal appearance  She is not diaphoretic  HENT:      Head: Normocephalic and atraumatic  Eyes:      General: No scleral icterus  Extraocular Movements: Extraocular movements intact  Conjunctiva/sclera: Conjunctivae normal       Pupils: Pupils are equal, round, and reactive to light  Cardiovascular:      Rate and Rhythm: Normal rate and regular rhythm  Heart sounds: No murmur heard  Pulmonary:      Effort: Pulmonary effort is normal  No respiratory distress  Breath sounds: Normal breath sounds  No wheezing  Musculoskeletal:      Cervical back: Normal range of motion  Right lower leg: No edema  Left lower leg: No edema  Lymphadenopathy:      Cervical: No cervical adenopathy  Neurological:      Mental Status: She is alert and oriented to person, place, and time  Mental status is at baseline  Sensory: No sensory deficit  Gait: Gait normal    Psychiatric:         Mood and Affect: Mood normal          Behavior: Behavior normal          Thought Content: Thought content normal          The history was obtained from the review of the chart, patient  Lab Results:   Lab Results   Component Value Date/Time    Free t4 1 4 08/19/2022 07:32 AM       Imaging Studies:   Results for orders placed during the hospital encounter of 10/12/21    US thyroid    Impression  Heterogeneous thyroid gland  No nodule meets current ACR criteria for requiring biopsy or followup ultrasounds  Reference: ACR Thyroid Imaging, Reporting and Data System (TI-RADS): White Paper of the [facility]   J AM Donald Radiol [id number]  (additional recommendations based on American Thyroid Association 2015 guidelines )      Workstation performed: [id number]      I have personally reviewed pertinent reports  Portions of the record may have been created with voice recognition software  Occasional wrong word or "sound a like" substitutions may have occurred due to the inherent limitations of voice recognition software  Read the chart carefully and recognize, using context, where substitutions have occurred

## 2022-11-29 NOTE — PATIENT INSTRUCTIONS
Change Synthroid to 137 mcg 1 tablet 6 days a week and no tablet on Sunday  Continue with lifestyle modifications to help with weight loss  Can consider medications in the future if needed  Contact the office with any change in symptoms  Follow-up in 6 months with lab work completed prior to visit

## 2023-04-03 DIAGNOSIS — E28.2 PCOS (POLYCYSTIC OVARIAN SYNDROME): Primary | ICD-10-CM

## 2023-04-03 RX ORDER — METFORMIN HYDROCHLORIDE 500 MG/1
500 TABLET, EXTENDED RELEASE ORAL
Qty: 30 TABLET | Refills: 6 | Status: SHIPPED | OUTPATIENT
Start: 2023-04-03

## 2023-05-04 LAB
25(OH)D3 SERPL-MCNC: 30 NG/ML (ref 30–100)
INSULIN SERPL-ACNC: 13.8 UIU/ML
T4 FREE SERPL-MCNC: 1.1 NG/DL (ref 0.8–1.8)
TSH SERPL-ACNC: 7.77 MIU/L (ref 0.4–4.5)
VIT B12 SERPL-MCNC: 1059 PG/ML (ref 200–1100)

## 2023-05-14 DIAGNOSIS — E28.2 PCOS (POLYCYSTIC OVARIAN SYNDROME): ICD-10-CM

## 2023-05-15 RX ORDER — METFORMIN HYDROCHLORIDE 500 MG/1
TABLET, EXTENDED RELEASE ORAL
Qty: 180 TABLET | Refills: 3 | Status: SHIPPED | OUTPATIENT
Start: 2023-05-15

## 2023-05-30 ENCOUNTER — OFFICE VISIT (OUTPATIENT)
Dept: ENDOCRINOLOGY | Facility: HOSPITAL | Age: 58
End: 2023-05-30

## 2023-05-30 VITALS
HEART RATE: 72 BPM | WEIGHT: 209 LBS | HEIGHT: 67 IN | DIASTOLIC BLOOD PRESSURE: 80 MMHG | BODY MASS INDEX: 32.8 KG/M2 | SYSTOLIC BLOOD PRESSURE: 110 MMHG

## 2023-05-30 DIAGNOSIS — E06.3 HYPOTHYROIDISM DUE TO HASHIMOTO'S THYROIDITIS: Primary | ICD-10-CM

## 2023-05-30 DIAGNOSIS — E28.2 PCOS (POLYCYSTIC OVARIAN SYNDROME): ICD-10-CM

## 2023-05-30 DIAGNOSIS — E55.9 VITAMIN D DEFICIENCY: ICD-10-CM

## 2023-05-30 DIAGNOSIS — E66.9 CLASS 1 OBESITY WITHOUT SERIOUS COMORBIDITY WITH BODY MASS INDEX (BMI) OF 32.0 TO 32.9 IN ADULT, UNSPECIFIED OBESITY TYPE: ICD-10-CM

## 2023-05-30 DIAGNOSIS — E27.8 LEFT ADRENAL MASS (HCC): ICD-10-CM

## 2023-05-30 DIAGNOSIS — E03.8 HYPOTHYROIDISM DUE TO HASHIMOTO'S THYROIDITIS: Primary | ICD-10-CM

## 2023-05-30 DIAGNOSIS — E53.8 VITAMIN B12 DEFICIENCY: ICD-10-CM

## 2023-05-30 PROBLEM — E66.811 CLASS 1 OBESITY WITHOUT SERIOUS COMORBIDITY WITH BODY MASS INDEX (BMI) OF 30.0 TO 30.9 IN ADULT: Status: ACTIVE | Noted: 2023-05-30

## 2023-05-30 RX ORDER — LEVOTHYROXINE SODIUM 0.15 MG/1
150 TABLET ORAL DAILY
Qty: 90 TABLET | Refills: 3 | Status: SHIPPED | OUTPATIENT
Start: 2023-05-30

## 2023-05-30 NOTE — PROGRESS NOTES
5/31/2023    Assessment/Plan      Diagnoses and all orders for this visit:    Hypothyroidism due to Hashimoto's thyroiditis  -     levothyroxine 150 mcg tablet; Take 1 tablet (150 mcg total) by mouth daily  -     TSH, 3rd generation Lab Collect; Future  -     T4, free Lab Collect; Future  -     Comprehensive metabolic panel Lab Collect; Future  -     Insulin, fasting- Lab Collect; Future  -     HEMOGLOBIN A1C W/ EAG ESTIMATION Lab Collect; Future    PCOS (polycystic ovarian syndrome)  -     semaglutide, 0 25 or 0 5 mg/dose, (Ozempic) 2 mg/3 mL injection pen; 0 25 mg once a week for 2 weeks and then 0 5 mg once a week thereafter  -     TSH, 3rd generation Lab Collect; Future  -     T4, free Lab Collect; Future  -     Comprehensive metabolic panel Lab Collect; Future  -     Insulin, fasting- Lab Collect; Future  -     HEMOGLOBIN A1C W/ EAG ESTIMATION Lab Collect; Future    Vitamin D deficiency  -     TSH, 3rd generation Lab Collect; Future  -     T4, free Lab Collect; Future  -     Comprehensive metabolic panel Lab Collect; Future  -     Insulin, fasting- Lab Collect; Future  -     HEMOGLOBIN A1C W/ EAG ESTIMATION Lab Collect; Future    Vitamin B12 deficiency  -     TSH, 3rd generation Lab Collect; Future  -     T4, free Lab Collect; Future  -     Comprehensive metabolic panel Lab Collect; Future  -     Insulin, fasting- Lab Collect; Future  -     HEMOGLOBIN A1C W/ EAG ESTIMATION Lab Collect; Future    Left adrenal mass (HCC)  -     TSH, 3rd generation Lab Collect; Future  -     T4, free Lab Collect; Future  -     Comprehensive metabolic panel Lab Collect; Future  -     Insulin, fasting- Lab Collect; Future  -     HEMOGLOBIN A1C W/ EAG ESTIMATION Lab Collect;  Future    Class 1 obesity without serious comorbidity with body mass index (BMI) of 32 0 to 32 9 in adult, unspecified obesity type  -     semaglutide, 0 25 or 0 5 mg/dose, (Ozempic) 2 mg/3 mL injection pen; 0 25 mg once a week for 2 weeks and then 0 5 mg once a week thereafter  -     TSH, 3rd generation Lab Collect; Future  -     T4, free Lab Collect; Future  -     Comprehensive metabolic panel Lab Collect; Future  -     Insulin, fasting- Lab Collect; Future  -     HEMOGLOBIN A1C W/ EAG ESTIMATION Lab Collect; Future        Assessment/Plan:  1  Hypothyroidism due to Hashimoto's thyroiditis  Most recent thyroid function studies do show an elevated TSH consistent with biochemical hypothyroidism  I have asked her to increase her levothyroxine to 150 mcg 1 tablet daily  2   Polycystic ovary syndrome  She will continue the same metformin  mg twice daily  3   Obesity  We started metformin for insulin resistance but she has not lost much weight  I am going to try ordering Ozempic 0 25 mg once a week for 2 weeks and then 0 5 mg once a week thereafter  4   Left adrenal mass  This has been ruled out to be hyperfunctioning on previous work-up  5   Vitamin D deficiency  She can increase to 3000 units vitamin D daily as her vitamin D level is low normal     6   Vitamin B12 deficiency  She will continue the same vitamin B12 1000 mcg daily  I have asked her to follow-up in 3 months with preceding hemoglobin A1c, fasting CMP and insulin level, TSH, free T4       CC: Hypothyroid, PCOS, obesity, vitamin D deficiency, vitamin B12 deficiency, adrenal follow-up    History of Present Illness     HPI: Darby Grewal is a 63y o  year old female with history of hypothyroidism due to Hashimoto's thyroiditis, polycystic ovary syndrome, left adrenal mass, vitamin D deficiency, vitamin B12 deficiency for follow-up visit  She was diagnosed with hypothyroidism due to Hashimoto's thyroiditis in 2005 when her weight started to climb despite working out  She has been on thyroid hormone for replacement purposes ever since  She is taking Synthroid 137 mcg 1 tablet 6 days a week  She has some depression  She has no insomnia, anxiety, diarrhea or constipation    She has chronic dry skin and hair loss but no brittle nails  She denies heat or cold intolerance, palpitation, tremors, or weight changes  She is fatigued a lot  She has a history of left adrenal mass noted on CT scan in November 2015  This was consistent with benign adenoma  Overnight dexamethasone suppression test in catecholamines were normal in summer 2021 to rule out hyperfunctioning of her adrenal mass      She has a history of polycystic ovary syndrome diagnosed around 2008  She had been using oral contraceptives for this problem  She stopped OCP due to age about December 2021  She is started on metformin  mg twice daily in April 2023  SHe is not losing weight with weight watcher's and walking  Weight 3 lbs less than last visit  She has vitamin D deficiency and takes vitamin D 2000 units daily  She has vitamin B12 deficiency takes vitamin B12 1000 mcg daily  Review of Systems   Constitutional: Positive for fatigue  Negative for unexpected weight change  Weight loss 3 lbs since last visit  The previous weight loss of 50 lbs in 2017 with weight watcher's and walking is not successful now  Fatigue a lot  HENT: Positive for trouble swallowing  Chokes a lot on food and feels caught on the left side, has to cough it up, has been fully evaluated  Eyes: Positive for visual disturbance  Wears glasses  Some blurry and diplopia, has prisms in the glasses  Respiratory: Negative for chest tightness and shortness of breath  Cardiovascular: Negative for chest pain and palpitations  Gastrointestinal: Negative for abdominal pain, constipation, diarrhea and nausea  Endocrine: Positive for polyphagia  Negative for cold intolerance, heat intolerance, polydipsia and polyuria  Drinks  A lot of water so urinates a lot  Nocturia 1-2 times a night  Genitourinary:        No menses in over 1 year  Skin: Negative for rash  Has chronic dry skin  No brittle nails  Has hair loss  Neurological: Negative for dizziness, tremors, light-headedness, numbness and headaches  Burning sensation in the left 2nd to 3rd toes  Psychiatric/Behavioral: Positive for dysphoric mood  Negative for sleep disturbance  The patient is not nervous/anxious  Does snore at night, no sleep apnea as was evaluated          Historical Information   Past Medical History:   Diagnosis Date   • Colon polyps    • Depression    • Exercise-induced asthma     with aggressive work out     Past Surgical History:   Procedure Laterality Date   •  SECTION      X 2   • LAMINECTOMY      L4-5   • LAPAROSCOPIC CHOLECYSTECTOMY     • TUBAL LIGATION Bilateral      Social History   Social History     Substance and Sexual Activity   Alcohol Use Never     Social History     Substance and Sexual Activity   Drug Use Never     Social History     Tobacco Use   Smoking Status Former   • Packs/day:    • Years: 15 00   • Total pack years: 15 00   • Types: Cigarettes   • Quit date: 2004   • Years since quittin 4   Smokeless Tobacco Never     Family History:   Family History   Problem Relation Age of Onset   • Hypertension Mother    • Hypertension Father    • Leukemia Father    • Diabetes type II Father    • Hypothyroidism Father    • Colon polyps Sister    • Lung disease Sister    • Colon polyps Sister    • Diabetes type I Son    • Tourette syndrome Son    • No Known Problems Son        Meds/Allergies   Current Outpatient Medications   Medication Sig Dispense Refill   • cholecalciferol (VITAMIN D3) 1,000 units tablet Take 2 tablets (2,000 Units total) by mouth daily     • fexofenadine (ALLEGRA) 180 MG tablet Take 180 mg by mouth daily     • FLUoxetine (PROzac) 40 MG capsule Take 40 mg by mouth daily     • levothyroxine 150 mcg tablet Take 1 tablet (150 mcg total) by mouth daily 90 tablet 3   • metFORMIN (GLUCOPHAGE-XR) 500 mg 24 hr tablet TAKE 1 TABLET BY MOUTH TWICE A DAY WITH MEALS 180 tablet 3 "  • semaglutide, 0 25 or 0 5 mg/dose, (Ozempic) 2 mg/3 mL injection pen 0 25 mg once a week for 2 weeks and then 0 5 mg once a week thereafter 3 mL 4   • vitamin B-12 (VITAMIN B-12) 1,000 mcg tablet Take by mouth daily       No current facility-administered medications for this visit  Allergies   Allergen Reactions   • Iodine - Food Allergy Vomiting     And sweats    • Sulfa Antibiotics Rash       Objective   Vitals: Blood pressure 110/80, pulse 72, height 5' 7\" (1 702 m), weight 94 8 kg (209 lb)  Invasive Devices     None                 Physical Exam  Vitals reviewed  Constitutional:       Appearance: Normal appearance  She is well-developed  She is obese  HENT:      Head: Normocephalic and atraumatic  Eyes:      Extraocular Movements: Extraocular movements intact  Conjunctiva/sclera: Conjunctivae normal       Comments: No lid lag, stare, proptosis, or periorbital edema  Neck:      Thyroid: No thyromegaly  Vascular: No carotid bruit  Comments: Thyroid normal in size  No palpable thyroid nodules  Cardiovascular:      Rate and Rhythm: Normal rate and regular rhythm  Heart sounds: Normal heart sounds  No murmur heard  Pulmonary:      Effort: Pulmonary effort is normal       Breath sounds: Normal breath sounds  No wheezing  Abdominal:      Palpations: Abdomen is soft  Musculoskeletal:         General: No deformity  Normal range of motion  Cervical back: Normal range of motion and neck supple  Right lower leg: No edema  Left lower leg: No edema  Comments: No tremor of the outstretched hands  Lymphadenopathy:      Cervical: No cervical adenopathy  Skin:     General: Skin is warm and dry  Findings: No rash  Neurological:      Mental Status: She is alert and oriented to person, place, and time  Deep Tendon Reflexes: Reflexes are normal and symmetric        Comments: Patellar deep tendon reflexes normal          The history was obtained from the " review of the chart and from the patient  Lab Results:      Blood work performed on 5/3/2023 showed a 25-hydroxy vitamin D level of 30  Vitamin B12 level was 1059  TSH is 7 77 with a free T41 1  Fasting insulin level is 13 8  Lab Results   Component Value Date    BUN 15 08/19/2022     08/19/2022    CO2 28 08/19/2022    CREATININE 0 66 08/19/2022    K 5 3 08/19/2022     eGFR   Date Value Ref Range Status   08/19/2022 103 > OR = 60 mL/min/1 73m2 Final     Comment:     The eGFR is based on the CKD-EPI 2021 equation  To calculate   the new eGFR from a previous Creatinine or Cystatin C  result, go to [email or web address] at  [email or web address]           Lab Results   Component Value Date    ALKPHOS 100 08/19/2022    ALT 23 08/19/2022    AST 16 08/19/2022       Lab Results   Component Value Date    FREET4 1 1 05/03/2023    TSH 7 77 (H) 05/03/2023             Future Appointments   Date Time Provider Mandy Villalba   10/9/2023  3:40 PM Indra Mcwilliams MD [facility]

## 2023-09-27 LAB
ALBUMIN SERPL-MCNC: 4.1 G/DL (ref 3.6–5.1)
ALBUMIN/GLOB SERPL: 1.6 (CALC) (ref 1–2.5)
ALP SERPL-CCNC: 104 U/L (ref 37–153)
ALT SERPL-CCNC: 17 U/L (ref 6–29)
AST SERPL-CCNC: 15 U/L (ref 10–35)
BILIRUB SERPL-MCNC: 0.4 MG/DL (ref 0.2–1.2)
BUN SERPL-MCNC: 13 MG/DL (ref 7–25)
BUN/CREAT SERPL: NORMAL (CALC) (ref 6–22)
CALCIUM SERPL-MCNC: 9.4 MG/DL (ref 8.6–10.4)
CHLORIDE SERPL-SCNC: 106 MMOL/L (ref 98–110)
CO2 SERPL-SCNC: 28 MMOL/L (ref 20–32)
CREAT SERPL-MCNC: 0.74 MG/DL (ref 0.5–1.03)
GFR/BSA.PRED SERPLBLD CYS-BASED-ARV: 94 ML/MIN/1.73M2
GLOBULIN SER CALC-MCNC: 2.6 G/DL (CALC) (ref 1.9–3.7)
GLUCOSE SERPL-MCNC: 82 MG/DL (ref 65–99)
HBA1C MFR BLD: 5 % OF TOTAL HGB
INSULIN SERPL-ACNC: 10 UIU/ML
POTASSIUM SERPL-SCNC: 5.3 MMOL/L (ref 3.5–5.3)
PROT SERPL-MCNC: 6.7 G/DL (ref 6.1–8.1)
SODIUM SERPL-SCNC: 141 MMOL/L (ref 135–146)
T4 FREE SERPL-MCNC: 1.5 NG/DL (ref 0.8–1.8)
TSH SERPL-ACNC: 0.05 MIU/L (ref 0.4–4.5)

## 2023-10-09 ENCOUNTER — OFFICE VISIT (OUTPATIENT)
Dept: ENDOCRINOLOGY | Facility: HOSPITAL | Age: 58
End: 2023-10-09
Payer: COMMERCIAL

## 2023-10-09 VITALS
WEIGHT: 194 LBS | HEIGHT: 63 IN | DIASTOLIC BLOOD PRESSURE: 80 MMHG | BODY MASS INDEX: 34.38 KG/M2 | SYSTOLIC BLOOD PRESSURE: 128 MMHG | HEART RATE: 72 BPM | OXYGEN SATURATION: 98 %

## 2023-10-09 DIAGNOSIS — E66.9 CLASS 1 OBESITY WITHOUT SERIOUS COMORBIDITY WITH BODY MASS INDEX (BMI) OF 34.0 TO 34.9 IN ADULT, UNSPECIFIED OBESITY TYPE: ICD-10-CM

## 2023-10-09 DIAGNOSIS — E03.8 HYPOTHYROIDISM DUE TO HASHIMOTO'S THYROIDITIS: Primary | ICD-10-CM

## 2023-10-09 DIAGNOSIS — E53.8 VITAMIN B12 DEFICIENCY: ICD-10-CM

## 2023-10-09 DIAGNOSIS — E06.3 HYPOTHYROIDISM DUE TO HASHIMOTO'S THYROIDITIS: Primary | ICD-10-CM

## 2023-10-09 DIAGNOSIS — E28.2 PCOS (POLYCYSTIC OVARIAN SYNDROME): ICD-10-CM

## 2023-10-09 DIAGNOSIS — E55.9 VITAMIN D DEFICIENCY: ICD-10-CM

## 2023-10-09 DIAGNOSIS — E27.8 LEFT ADRENAL MASS (HCC): ICD-10-CM

## 2023-10-09 PROCEDURE — 99214 OFFICE O/P EST MOD 30 MIN: CPT | Performed by: INTERNAL MEDICINE

## 2023-10-09 RX ORDER — LEVOTHYROXINE SODIUM 0.15 MG/1
TABLET ORAL
Qty: 90 TABLET | Refills: 3 | Status: SHIPPED | OUTPATIENT
Start: 2023-10-09

## 2023-10-09 NOTE — PROGRESS NOTES
10/9/2023    Assessment/Plan      Diagnoses and all orders for this visit:    Hypothyroidism due to Hashimoto's thyroiditis  -     levothyroxine 150 mcg tablet; Take 6 days a week and none on suday  -     TSH, 3rd generation Lab Collect; Future  -     T4, free Lab Collect; Future  -     Comprehensive metabolic panel Lab Collect; Future  -     CBC and differential Lab Collect; Future    PCOS (polycystic ovarian syndrome)  -     semaglutide, 1 mg/dose, (Ozempic) 4 mg/3 mL injection pen; Inject 0.75 mL (1 mg total) under the skin every 7 days  -     TSH, 3rd generation Lab Collect; Future  -     T4, free Lab Collect; Future  -     Comprehensive metabolic panel Lab Collect; Future  -     CBC and differential Lab Collect; Future    Left adrenal mass (HCC)  -     TSH, 3rd generation Lab Collect; Future  -     T4, free Lab Collect; Future  -     Comprehensive metabolic panel Lab Collect; Future  -     CBC and differential Lab Collect; Future    Vitamin B12 deficiency  -     TSH, 3rd generation Lab Collect; Future  -     T4, free Lab Collect; Future  -     Comprehensive metabolic panel Lab Collect; Future  -     CBC and differential Lab Collect; Future    Vitamin D deficiency  -     TSH, 3rd generation Lab Collect; Future  -     T4, free Lab Collect; Future  -     Comprehensive metabolic panel Lab Collect; Future  -     CBC and differential Lab Collect; Future    Class 1 obesity without serious comorbidity with body mass index (BMI) of 34.0 to 34.9 in adult, unspecified obesity type  -     semaglutide, 1 mg/dose, (Ozempic) 4 mg/3 mL injection pen; Inject 0.75 mL (1 mg total) under the skin every 7 days  -     TSH, 3rd generation Lab Collect; Future  -     T4, free Lab Collect; Future  -     Comprehensive metabolic panel Lab Collect; Future  -     CBC and differential Lab Collect; Future        Assessment/Plan:  1. Hypothyroidism due to Hashimoto's thyroiditis.   Most recent thyroid function studies now show that her thyroid is overactive which could be due to her weight loss. I will have her decrease her levothyroxine to 150 mcg 1 tablet 6 days a week. 2.  Polycystic ovary syndrome. She is utilizing metformin and Ozempic for her insulin resistance. She has lost weight on Ozempic and would like to increase to 1 mg once a week which I will do. She will continue the same metformin  mg daily. 3.  Left adrenal mass. She has no symptoms of adrenal hyperfunction. 4.  Vitamin D deficiency. She will continue the same vitamin D 2000 units daily. 5.  Vitamin B12 deficiency. She will continue the same vitamin B12 1000 mcg daily. I have asked her to follow-up in 3 months with preceding CMP, CBC, TSH, and free T4.      CC: Hypothyroid, PCOS, vitamin D, vitamin B12, adrenal follow-up    History of Present Illness     HPI: Mary Ann Berg is a 63y.o. year old female with history of hypothyroidism due to Hashimoto's thyroiditis, polycystic ovary syndrome, left adrenal mass, vitamin D deficiency, vitamin B12 deficiency for follow-up visit. She was diagnosed with hypothyroidism due to Hashimoto's thyroiditis in 2005 when her weight started to climb despite working out. She has been on thyroid hormone for replacement purposes ever since. She is taking Synthroid 150 mcg 1 tablet daily. She denies heat or cold intolerance, palpitation, or tremors. She denies diarrhea or constipation. She does have some fatigue and has trouble getting up in the morning. She also admits to needing to sleep a long time and some depression but no anxiety. She has dry skin and brittle nails along with hair loss. Weight is 15 pounds less than May 2023. She does have diplopia which is improved with prisms. She has a history of left adrenal mass noted on CT scan in November 2015. This was consistent with benign adenoma.   Overnight dexamethasone suppression test in catecholamines were normal in summer 2021 to rule out hyperfunctioning of her adrenal mass. She has a history of polycystic ovary syndrome diagnosed around 2008. She had been using oral contraceptives for this problem. She stopped OCP due to age about December 2021. She is started on metformin  mg once daily in April 2023. Had to decrease to once daily metformin due to GI issues. She was not losing weight in May 2023 and semaglutide was ordered. She is taking semaglutide 0.5 mg once a week. She has been losing weight and has lost 15 pounds since May 2023. She does have some occasional nausea. She denies polyuria, polydipsia, or polyphasia. She has once or twice a night nocturia. She has no extremity paresthesias but has a history of diplopia although no blurry vision. She has vitamin D deficiency and takes vitamin D 2000 units daily. She has vitamin B12 deficiency and takes vitamin B12 1000 mcg daily. Review of Systems   Constitutional:  Positive for fatigue. Negative for unexpected weight change. Weight 15 pounds last than May 2023. Hard to get up in am. Still doing weight watcher's and exercising 3 days a week for 1.5 hours. HENT:  Negative for trouble swallowing. Eyes:  Positive for visual disturbance. Wears glasses. Has diplopia. Has prisms. Respiratory:  Negative for chest tightness and shortness of breath. Cardiovascular:  Negative for chest pain and palpitations. Gastrointestinal:  Positive for nausea. Negative for abdominal pain, constipation and diarrhea. Nausea recently. Sat and Sunday gets nausea. Rare abdominal pain. Endocrine: Negative for cold intolerance, heat intolerance, polydipsia, polyphagia and polyuria. Nocturia 1-2 times a night. Skin:  Negative for wound. Has dry skin. Has brittle nails. Has hair loss. Neurological:  Negative for tremors and numbness. Burning of the toes at times. Psychiatric/Behavioral:  Positive for dysphoric mood. Negative for sleep disturbance.  The patient is not nervous/anxious. Will sleep a long time, up to 13 hours a night if she could. Historical Information   Past Medical History:   Diagnosis Date    Colon polyps     Depression     Exercise-induced asthma     with aggressive work out     Past Surgical History:   Procedure Laterality Date     SECTION      X 2    LAMINECTOMY      L4-5    LAPAROSCOPIC CHOLECYSTECTOMY      TUBAL LIGATION Bilateral      Social History   Social History     Substance and Sexual Activity   Alcohol Use Never     Social History     Substance and Sexual Activity   Drug Use Never     Social History     Tobacco Use   Smoking Status Former    Packs/day: 1.00    Years: 15.00    Total pack years: 15.00    Types: Cigarettes    Quit date: 2004    Years since quittin.7   Smokeless Tobacco Never     Family History:   Family History   Problem Relation Age of Onset    Hypertension Mother     Hypertension Father     Leukemia Father     Diabetes type II Father     Hypothyroidism Father     Colon polyps Sister     Lung disease Sister     Colon polyps Sister     Diabetes type I Son     Tourette syndrome Son     No Known Problems Son        Meds/Allergies   Current Outpatient Medications   Medication Sig Dispense Refill    cholecalciferol (VITAMIN D3) 1,000 units tablet Take 2 tablets (2,000 Units total) by mouth daily      fexofenadine (ALLEGRA) 180 MG tablet Take 180 mg by mouth daily      FLUoxetine (PROzac) 40 MG capsule Take 40 mg by mouth daily      levothyroxine 150 mcg tablet Take 6 days a week and none on suday 90 tablet 3    metFORMIN (GLUCOPHAGE-XR) 500 mg 24 hr tablet TAKE 1 TABLET BY MOUTH TWICE A DAY WITH MEALS 180 tablet 3    semaglutide, 1 mg/dose, (Ozempic) 4 mg/3 mL injection pen Inject 0.75 mL (1 mg total) under the skin every 7 days 3 mL 6    vitamin B-12 (VITAMIN B-12) 1,000 mcg tablet Take by mouth daily       No current facility-administered medications for this visit.      Allergies   Allergen Reactions    Iodine - Food Allergy Vomiting     And sweats     Sulfa Antibiotics Rash       Objective   Vitals: Blood pressure 128/80, pulse 72, height 5' 3" (1.6 m), weight 88 kg (194 lb), SpO2 98 %. Invasive Devices       None                   Physical Exam  Vitals reviewed. Constitutional:       Appearance: Normal appearance. She is well-developed. She is obese. HENT:      Head: Normocephalic and atraumatic. Eyes:      Extraocular Movements: Extraocular movements intact. Conjunctiva/sclera: Conjunctivae normal.      Comments: No lid lag, stare, proptosis, or periorbital edema. Neck:      Thyroid: No thyromegaly. Vascular: No carotid bruit. Comments: Thyroid normal in size. No palpable thyroid nodules. Cardiovascular:      Rate and Rhythm: Normal rate and regular rhythm. Heart sounds: Normal heart sounds. No murmur heard. Pulmonary:      Effort: Pulmonary effort is normal.      Breath sounds: Normal breath sounds. No wheezing. Abdominal:      Palpations: Abdomen is soft. Musculoskeletal:         General: No deformity. Normal range of motion. Cervical back: Normal range of motion and neck supple. Right lower leg: No edema. Left lower leg: No edema. Comments: No CVA tenderness. No tremor of the outstretched hands. Lymphadenopathy:      Cervical: No cervical adenopathy. Skin:     General: Skin is warm and dry. Findings: No rash. Neurological:      Mental Status: She is alert and oriented to person, place, and time. Deep Tendon Reflexes: Reflexes are normal and symmetric. Comments: Patellar deep tendon reflexes normal.         The history was obtained from the review of the chart and from the patient. Lab Results:      Blood work performed on 9/26/2023 showed a hemoglobin A1c of 5. CMP showed a glucose of 82 fasting but was otherwise normal.  Fasting insulin level was 10. TSH is 0.05 with free T41.5.     Lab Results   Component Value Date    CREATININE 0.74 09/26/2023    CREATININE 0.66 08/19/2022    BUN 13 09/26/2023    K 5.3 09/26/2023     09/26/2023    CO2 28 09/26/2023     eGFR   Date Value Ref Range Status   09/26/2023 94 > OR = 60 mL/min/1.73m2 Final         Lab Results   Component Value Date    ALT 17 09/26/2023    AST 15 09/26/2023    ALKPHOS 104 09/26/2023       Lab Results   Component Value Date    TSH 0.05 (L) 09/26/2023    FREET4 1.5 09/26/2023             Future Appointments   Date Time Provider [address]   2/6/2024  4:00 PM Stacey Shabazz MD [facility]

## 2023-10-09 NOTE — PATIENT INSTRUCTIONS
The thyroid is now overactive. Let's decrease the levothyroxine to 150 mcg 1 tablet 6 days a week. We'll increase the semaglutide to 1 mg once a week. Let me know how your stomach tolerates it if not good. Follow up in 3 months with blood work.

## 2023-12-13 LAB
ALBUMIN SERPL-MCNC: 4 G/DL (ref 3.6–5.1)
ALBUMIN/GLOB SERPL: 1.5 (CALC) (ref 1–2.5)
ALP SERPL-CCNC: 99 U/L (ref 37–153)
ALT SERPL-CCNC: 18 U/L (ref 6–29)
AST SERPL-CCNC: 21 U/L (ref 10–35)
BASOPHILS # BLD AUTO: 28 CELLS/UL (ref 0–200)
BASOPHILS NFR BLD AUTO: 0.4 %
BILIRUB SERPL-MCNC: 0.3 MG/DL (ref 0.2–1.2)
BUN SERPL-MCNC: 12 MG/DL (ref 7–25)
BUN/CREAT SERPL: NORMAL (CALC) (ref 6–22)
CALCIUM SERPL-MCNC: 9.4 MG/DL (ref 8.6–10.4)
CHLORIDE SERPL-SCNC: 105 MMOL/L (ref 98–110)
CO2 SERPL-SCNC: 27 MMOL/L (ref 20–32)
CREAT SERPL-MCNC: 0.62 MG/DL (ref 0.5–1.03)
EOSINOPHIL # BLD AUTO: 92 CELLS/UL (ref 15–500)
EOSINOPHIL NFR BLD AUTO: 1.3 %
ERYTHROCYTE [DISTWIDTH] IN BLOOD BY AUTOMATED COUNT: 12.3 % (ref 11–15)
GFR/BSA.PRED SERPLBLD CYS-BASED-ARV: 104 ML/MIN/1.73M2
GLOBULIN SER CALC-MCNC: 2.7 G/DL (CALC) (ref 1.9–3.7)
GLUCOSE SERPL-MCNC: 85 MG/DL (ref 65–99)
HCT VFR BLD AUTO: 40.5 % (ref 35–45)
HGB BLD-MCNC: 13.4 G/DL (ref 11.7–15.5)
LYMPHOCYTES # BLD AUTO: 2691 CELLS/UL (ref 850–3900)
LYMPHOCYTES NFR BLD AUTO: 37.9 %
MCH RBC QN AUTO: 31.4 PG (ref 27–33)
MCHC RBC AUTO-ENTMCNC: 33.1 G/DL (ref 32–36)
MCV RBC AUTO: 94.8 FL (ref 80–100)
MONOCYTES # BLD AUTO: 511 CELLS/UL (ref 200–950)
MONOCYTES NFR BLD AUTO: 7.2 %
NEUTROPHILS # BLD AUTO: 3777 CELLS/UL (ref 1500–7800)
NEUTROPHILS NFR BLD AUTO: 53.2 %
PLATELET # BLD AUTO: 369 THOUSAND/UL (ref 140–400)
PMV BLD REES-ECKER: 10.5 FL (ref 7.5–12.5)
POTASSIUM SERPL-SCNC: 4.7 MMOL/L (ref 3.5–5.3)
PROT SERPL-MCNC: 6.7 G/DL (ref 6.1–8.1)
RBC # BLD AUTO: 4.27 MILLION/UL (ref 3.8–5.1)
SODIUM SERPL-SCNC: 139 MMOL/L (ref 135–146)
T4 FREE SERPL-MCNC: 1.7 NG/DL (ref 0.8–1.8)
TSH SERPL-ACNC: 0.03 MIU/L (ref 0.4–4.5)
WBC # BLD AUTO: 7.1 THOUSAND/UL (ref 3.8–10.8)

## 2023-12-20 DIAGNOSIS — E28.2 PCOS (POLYCYSTIC OVARIAN SYNDROME): Primary | ICD-10-CM

## 2023-12-20 DIAGNOSIS — R11.0 NAUSEA: ICD-10-CM

## 2023-12-20 RX ORDER — ONDANSETRON 4 MG/1
4 TABLET, FILM COATED ORAL EVERY 8 HOURS PRN
Qty: 20 TABLET | Refills: 2 | Status: SHIPPED | OUTPATIENT
Start: 2023-12-20

## 2023-12-26 DIAGNOSIS — E03.8 HYPOTHYROIDISM DUE TO HASHIMOTO'S THYROIDITIS: Primary | ICD-10-CM

## 2023-12-26 DIAGNOSIS — E06.3 HYPOTHYROIDISM DUE TO HASHIMOTO'S THYROIDITIS: Primary | ICD-10-CM

## 2023-12-26 RX ORDER — LEVOTHYROXINE SODIUM 137 UG/1
TABLET ORAL
Qty: 90 TABLET | Refills: 0 | Status: SHIPPED | OUTPATIENT
Start: 2023-12-26

## 2024-02-06 ENCOUNTER — OFFICE VISIT (OUTPATIENT)
Dept: ENDOCRINOLOGY | Facility: HOSPITAL | Age: 59
End: 2024-02-06
Payer: COMMERCIAL

## 2024-02-06 VITALS
HEIGHT: 63 IN | WEIGHT: 182 LBS | SYSTOLIC BLOOD PRESSURE: 118 MMHG | BODY MASS INDEX: 32.25 KG/M2 | DIASTOLIC BLOOD PRESSURE: 80 MMHG | HEART RATE: 87 BPM

## 2024-02-06 DIAGNOSIS — E66.9 CLASS 1 OBESITY WITHOUT SERIOUS COMORBIDITY WITH BODY MASS INDEX (BMI) OF 34.0 TO 34.9 IN ADULT, UNSPECIFIED OBESITY TYPE: ICD-10-CM

## 2024-02-06 DIAGNOSIS — E06.3 HYPOTHYROIDISM DUE TO HASHIMOTO'S THYROIDITIS: Primary | ICD-10-CM

## 2024-02-06 DIAGNOSIS — E53.8 VITAMIN B12 DEFICIENCY: ICD-10-CM

## 2024-02-06 DIAGNOSIS — E55.9 VITAMIN D DEFICIENCY: ICD-10-CM

## 2024-02-06 DIAGNOSIS — E27.8 LEFT ADRENAL MASS (HCC): ICD-10-CM

## 2024-02-06 DIAGNOSIS — E03.8 HYPOTHYROIDISM DUE TO HASHIMOTO'S THYROIDITIS: Primary | ICD-10-CM

## 2024-02-06 DIAGNOSIS — E28.2 PCOS (POLYCYSTIC OVARIAN SYNDROME): ICD-10-CM

## 2024-02-06 PROCEDURE — 99214 OFFICE O/P EST MOD 30 MIN: CPT | Performed by: INTERNAL MEDICINE

## 2024-02-06 NOTE — PATIENT INSTRUCTIONS
Let's recheck the thyroid blood work next week.     Continue the same levothyroxine(T4) for now. We can consider adding liothyronine(T3)     We would Have to drop the dose of the Levothyroxine to add liothyronine.     The rest of the blood work is good.     Continue  the same metformin, exercise, portion control, and ozempic.     Follow up in 3-4 months with blood work.

## 2024-02-06 NOTE — PROGRESS NOTES
2/7/2024    Assessment/Plan     1. Hypothyroidism due to Hashimoto's thyroiditis  -     T3, free  -     T4, free; Future; Expected date: 02/06/2024  -     TSH, 3rd generation; Future  -     CBC and differential; Future; Expected date: 04/29/2024  -     Comprehensive metabolic panel; Future; Expected date: 04/29/2024  -     T3, free; Future; Expected date: 04/29/2024  -     T4, free; Future; Expected date: 04/29/2024  -     TSH, 3rd generation; Future; Expected date: 04/29/2024  -     Vitamin B12; Future; Expected date: 04/29/2024  -     Vitamin D 25 hydroxy; Future; Expected date: 04/29/2024    2. PCOS (polycystic ovarian syndrome)  -     CBC and differential; Future; Expected date: 04/29/2024  -     Comprehensive metabolic panel; Future; Expected date: 04/29/2024  -     T3, free; Future; Expected date: 04/29/2024  -     T4, free; Future; Expected date: 04/29/2024  -     TSH, 3rd generation; Future; Expected date: 04/29/2024  -     Vitamin B12; Future; Expected date: 04/29/2024  -     Vitamin D 25 hydroxy; Future; Expected date: 04/29/2024    3. Class 1 obesity without serious comorbidity with body mass index (BMI) of 34.0 to 34.9 in adult, unspecified obesity type  -     CBC and differential; Future; Expected date: 04/29/2024  -     Comprehensive metabolic panel; Future; Expected date: 04/29/2024  -     T3, free; Future; Expected date: 04/29/2024  -     T4, free; Future; Expected date: 04/29/2024  -     TSH, 3rd generation; Future; Expected date: 04/29/2024  -     Vitamin B12; Future; Expected date: 04/29/2024  -     Vitamin D 25 hydroxy; Future; Expected date: 04/29/2024    4. Left adrenal mass (HCC)  -     CBC and differential; Future; Expected date: 04/29/2024  -     Comprehensive metabolic panel; Future; Expected date: 04/29/2024  -     T3, free; Future; Expected date: 04/29/2024  -     T4, free; Future; Expected date: 04/29/2024  -     TSH, 3rd generation; Future; Expected date: 04/29/2024  -     Vitamin B12;  Future; Expected date: 04/29/2024  -     Vitamin D 25 hydroxy; Future; Expected date: 04/29/2024    5. Vitamin B12 deficiency  -     CBC and differential; Future; Expected date: 04/29/2024  -     Comprehensive metabolic panel; Future; Expected date: 04/29/2024  -     T3, free; Future; Expected date: 04/29/2024  -     T4, free; Future; Expected date: 04/29/2024  -     TSH, 3rd generation; Future; Expected date: 04/29/2024  -     Vitamin B12; Future; Expected date: 04/29/2024  -     Vitamin D 25 hydroxy; Future; Expected date: 04/29/2024    6. Vitamin D deficiency  -     CBC and differential; Future; Expected date: 04/29/2024  -     Comprehensive metabolic panel; Future; Expected date: 04/29/2024  -     T3, free; Future; Expected date: 04/29/2024  -     T4, free; Future; Expected date: 04/29/2024  -     TSH, 3rd generation; Future; Expected date: 04/29/2024  -     Vitamin B12; Future; Expected date: 04/29/2024  -     Vitamin D 25 hydroxy; Future; Expected date: 04/29/2024         1. Hypothyroidism due to Hashimoto's thyroiditis.  Most recent thyroid function studies did show over replacement of thyroid hormone with a low TSH. Her levothyroxine was decreased at that time to 6 days a week instead of everyday. At this point, it has been almost 8 weeks since the last dosage adjustment. I will have her repeat a TSH with free T4 and free T3 within the next week. We did discuss the possibility of adding T3 supplementation if her thyroid blood work is normal and she is still feeling fatigued to see if that affects her symptoms. She was told that if we add T3 supplementation, we will have to decrease her T4.    2. PCOS.  She is continuing to work on dietary changes and lower carbohydrate diet. She will continue the same metformin  mg once daily.    3. Obesity.  She has been successfully losing weight with Ozempic 1 mg once a week. She will continue to utilize Ozempic, diet, and exercise.    4. Left adrenal mass.  She has  no symptoms referable to hormonal hyperfunction. I will continue to follow her over time.    5. Vitamin D deficiency.  She will continue the same vitamin D 2000 units daily and I will repeat a vitamin D level with her next visit.    6. Vitamin B12 deficiency.  She will continue the same vitamin B12 1000 mcg and I will repeat a vitamin B12 level with her next visit. I have asked her to get a TSH with free T4 and free T3 performed in the next week.      I have asked her to follow up in 3 to 4 months with preceding CBC, CMP, TSH, free T4, free T3, vitamin B12 level, and 25-hydroxy vitamin D level.      CC: Hypothyroidism, polycystic ovary syndrome, left adrenal mass, vitamin D deficiency, vitamin B12 deficiency follow-up    HPI: Cristina Marin is a 58-year-old female with history of hypothyroidism due to Hashimoto's thyroiditis, polycystic ovary syndrome, left adrenal mass, vitamin D deficiency, vitamin B12 deficiency, who is present today for a follow-up visit.     She was diagnosed with hypothyroidism due to Hashimoto's thyroiditis in 2005 when her weight started to climb despite working out. She is on thyroid hormone for replacement purposes.    She has a history of a left adrenal mass noted on a CT scan in 11/2015. This was consistent with a benign adenoma. Overnight dexamethasone suppression test and catecholamines were normal in the summer of 2021 ruling out hyperfunctioning of her adrenal mass.    She has a history of polycystic ovary syndrome diagnosed around 2008. She has been using oral contraceptives for this problem. When she stopped them in 12/2021 and started metformin in 04/2023, but had to decrease it to once daily due to GI issues. Semaglutide was ordered in 05/2023 when she was not losing weight appropriately and this was done in 05/2023.    She is on a regimen of levothyroxine 137 mcg, administered 6 days per week for hyperthyroidism. She denies experiencing heat sensitivity, diaphoresis, or cold  intolerance, but reports mild nocturnal heat sensations. She has mild constipation, a side effect of Ozempic. She negates any palpitations, chest pain, dyspnea, or hand tremors. The patient frequently feels fatigued, has difficulty waking up in the morning despite going to bed early, and believes her sleep quality is satisfactory. She has a history of depression but is currently in a stable condition. 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 She experiences brittleness in her nails particularly on the hallux. There is significant alopecia on the lateral aspects of her scalp. She is on metformin  mg daily for Polycystic Ovary Syndrome and denies any side effects. She reports normal urination frequency and high water intake. Nocturia occurs once or twice per night. She denies symptoms of polydipsia and polyphagia. She experiences paresthesia and burning sensation in her middle toes but denies blurry vision.    She is currently on a daily dosage of 1 mg Ozempic, which aids in weight management. She recalls fluctuating weight, reaching nearly 50 pounds lost before experiencing some regain. Her weight was approximately 206 pounds when she commenced the Ozempic treatment. Currently, she has lost an additional 12 pounds. She experiences occasional nausea, typically on Saturdays following breakfast outings, despite dietary restrictions. However, she denies experiencing abdominal pain or bloating. She maintains a regular exercise regimen, attending the gym at least 4 days per week and engaging in weightlifting. She continues to participate in the Weight Watchers program.    She is currently on a daily regimen of B12 1000 mcg for vitamin B12 deficiency and 2000 units of vitamin D for vitamin D deficiency. She denies experiencing any numbness or tingling around the mouth. She reports experiencing right-sided lower back pain, specifically in the posterior thoracic region, but not below the rib cage. She denies experiencing  headaches. Upon rising in the morning, she experiences lightheadedness and dizziness, which she attributes to her age, necessitating a brief period of sitting. She denies any leg swelling. She reports feeling fatigued, particularly during [employer] classes. She has been on generic thyroid medication for several weeks and reports no difference in symptomatology compared to when she was on the branded version.          Review of Systems  The pertinent positive and negative findings are as noted in the HPI.    Historical Information   Past Medical History:   Diagnosis Date    Colon polyps     Depression     Exercise-induced asthma     with aggressive work out     Past Surgical History:   Procedure Laterality Date     SECTION      X 2    LAMINECTOMY      L4-5    LAPAROSCOPIC CHOLECYSTECTOMY      TUBAL LIGATION Bilateral      Social History   Social History     Substance and Sexual Activity   Alcohol Use Never     Social History     Substance and Sexual Activity   Drug Use Never     Social History     Tobacco Use   Smoking Status Former    Current packs/day: 0.00    Average packs/day: 1 pack/day for 15.0 years (15.0 ttl pk-yrs)    Types: Cigarettes    Start date: 1989    Quit date: 2004    Years since quittin.1   Smokeless Tobacco Never     Family History:   Family History   Problem Relation Age of Onset    Hypertension Mother     Hypertension Father     Leukemia Father     Diabetes type II Father     Hypothyroidism Father     Colon polyps Sister     Lung disease Sister     Colon polyps Sister     Diabetes type I Son     Tourette syndrome Son     No Known Problems Son        Meds/Allergies   Current Outpatient Medications   Medication Sig Dispense Refill    cholecalciferol (VITAMIN D3) 1,000 units tablet Take 2 tablets (2,000 Units total) by mouth daily      FLUoxetine (PROzac) 40 MG capsule Take 40 mg by mouth daily      levothyroxine (Levoxyl) 137 mcg tablet Take one tablet six days of the week 90  "tablet 0    metFORMIN (GLUCOPHAGE-XR) 500 mg 24 hr tablet TAKE 1 TABLET BY MOUTH TWICE A DAY WITH MEALS 180 tablet 3    ondansetron (ZOFRAN) 4 mg tablet Take 1 tablet (4 mg total) by mouth every 8 (eight) hours as needed for nausea or vomiting 20 tablet 2    semaglutide, 1 mg/dose, (Ozempic) 4 mg/3 mL injection pen Inject 0.75 mL (1 mg total) under the skin every 7 days 3 mL 6    vitamin B-12 (VITAMIN B-12) 1,000 mcg tablet Take by mouth daily       No current facility-administered medications for this visit.     Allergies   Allergen Reactions    Iodine - Food Allergy Vomiting     And sweats     Sulfa Antibiotics Rash       Objective   Vitals: Blood pressure 118/80, pulse 87, height 5' 3\" (1.6 m), weight 82.6 kg (182 lb).  Invasive Devices       None                   Physical Exam  Physical exams are normal with pertinent positives and negatives.     Eyes: No lid lags, stare, proptosis, or periorbital edema.   Neck: Thyroid normal in size. No palpable nodules.  Respiratory: Lungs are clear.  Cardiovascular: Heart is regular. No murmurs.  Musculoskeletal : No CVA tenderness.  Extremities: No lower extremity edema.  Neurologic: Negative Chvostek sign. No tremor of the outstretched hands. Patellar deep tendon reflex is normal.     The history was obtained from the review of the chart and from the patient.      Lab Results:          Lab Results   Component Value Date    CREATININE 0.62 12/12/2023    CREATININE 0.74 09/26/2023    CREATININE 0.66 08/19/2022    BUN 12 12/12/2023    K 4.7 12/12/2023     12/12/2023    CO2 27 12/12/2023     eGFR   Date Value Ref Range Status   12/12/2023 104 > OR = 60 mL/min/1.73m2 Final         Lab Results   Component Value Date    ALT 18 12/12/2023    AST 21 12/12/2023    ALKPHOS 99 12/12/2023       Lab Results   Component Value Date    TSH 0.03 (L) 12/12/2023    FREET4 1.7 12/12/2023         Blood work performed on 12/12/2023 showed a TSH of 0.03 with a free T4 of 1.7.     CBC is " normal.     CMP showed a glucose of 85 fasting but was otherwise normal.    Future Appointments   Date Time Provider Department Center   6/26/2024  4:00 PM Hannah Montes De Oca MD [facility]       Transcribed for Hannah Montes De Oca MD, by Jovanny Barajas on 02/07/24 at 11:16 AM. Powered by [employer].

## 2024-02-08 LAB
T3FREE SERPL-MCNC: 3 PG/ML (ref 2.3–4.2)
T4 FREE SERPL-MCNC: 1.4 NG/DL (ref 0.8–1.8)
TSH SERPL-ACNC: 0.03 MIU/L (ref 0.4–4.5)

## 2024-02-12 DIAGNOSIS — E06.3 HYPOTHYROIDISM DUE TO HASHIMOTO'S THYROIDITIS: Primary | ICD-10-CM

## 2024-02-12 DIAGNOSIS — E03.8 HYPOTHYROIDISM DUE TO HASHIMOTO'S THYROIDITIS: Primary | ICD-10-CM

## 2024-02-12 RX ORDER — LEVOTHYROXINE SODIUM 112 UG/1
112 TABLET ORAL DAILY
Qty: 90 TABLET | Refills: 2 | Status: SHIPPED | OUTPATIENT
Start: 2024-02-12

## 2024-03-13 ENCOUNTER — HOSPITAL ENCOUNTER (OUTPATIENT)
Dept: HOSPITAL 99 - WDC | Age: 59
End: 2024-03-13
Payer: COMMERCIAL

## 2024-03-13 DIAGNOSIS — Z12.31: Primary | ICD-10-CM

## 2024-05-07 DIAGNOSIS — E28.2 PCOS (POLYCYSTIC OVARIAN SYNDROME): Primary | ICD-10-CM

## 2024-05-10 DIAGNOSIS — E28.2 PCOS (POLYCYSTIC OVARIAN SYNDROME): Primary | ICD-10-CM

## 2024-05-10 RX ORDER — SEMAGLUTIDE 1.34 MG/ML
INJECTION, SOLUTION SUBCUTANEOUS
Qty: 3 ML | Refills: 1 | Status: SHIPPED | OUTPATIENT
Start: 2024-05-10

## 2024-06-11 LAB
25(OH)D3 SERPL-MCNC: 38 NG/ML (ref 30–100)
ALBUMIN SERPL-MCNC: 4 G/DL (ref 3.6–5.1)
ALBUMIN/GLOB SERPL: 1.6 (CALC) (ref 1–2.5)
ALP SERPL-CCNC: 108 U/L (ref 37–153)
ALT SERPL-CCNC: 10 U/L (ref 6–29)
AST SERPL-CCNC: 12 U/L (ref 10–35)
BASOPHILS # BLD AUTO: 27 CELLS/UL (ref 0–200)
BASOPHILS NFR BLD AUTO: 0.4 %
BILIRUB SERPL-MCNC: 0.2 MG/DL (ref 0.2–1.2)
BUN SERPL-MCNC: 12 MG/DL (ref 7–25)
BUN/CREAT SERPL: NORMAL (CALC) (ref 6–22)
CALCIUM SERPL-MCNC: 9.2 MG/DL (ref 8.6–10.4)
CHLORIDE SERPL-SCNC: 106 MMOL/L (ref 98–110)
CO2 SERPL-SCNC: 27 MMOL/L (ref 20–32)
CREAT SERPL-MCNC: 0.7 MG/DL (ref 0.5–1.03)
EOSINOPHIL # BLD AUTO: 109 CELLS/UL (ref 15–500)
EOSINOPHIL NFR BLD AUTO: 1.6 %
ERYTHROCYTE [DISTWIDTH] IN BLOOD BY AUTOMATED COUNT: 12.1 % (ref 11–15)
GFR/BSA.PRED SERPLBLD CYS-BASED-ARV: 100 ML/MIN/1.73M2
GLOBULIN SER CALC-MCNC: 2.5 G/DL (CALC) (ref 1.9–3.7)
GLUCOSE SERPL-MCNC: 69 MG/DL (ref 65–99)
HCT VFR BLD AUTO: 38.5 % (ref 35–45)
HGB BLD-MCNC: 12.7 G/DL (ref 11.7–15.5)
LYMPHOCYTES # BLD AUTO: 2638 CELLS/UL (ref 850–3900)
LYMPHOCYTES NFR BLD AUTO: 38.8 %
MCH RBC QN AUTO: 31.4 PG (ref 27–33)
MCHC RBC AUTO-ENTMCNC: 33 G/DL (ref 32–36)
MCV RBC AUTO: 95.3 FL (ref 80–100)
MONOCYTES # BLD AUTO: 408 CELLS/UL (ref 200–950)
MONOCYTES NFR BLD AUTO: 6 %
NEUTROPHILS # BLD AUTO: 3618 CELLS/UL (ref 1500–7800)
NEUTROPHILS NFR BLD AUTO: 53.2 %
PLATELET # BLD AUTO: 318 THOUSAND/UL (ref 140–400)
PMV BLD REES-ECKER: 10.8 FL (ref 7.5–12.5)
POTASSIUM SERPL-SCNC: 5.2 MMOL/L (ref 3.5–5.3)
PROT SERPL-MCNC: 6.5 G/DL (ref 6.1–8.1)
RBC # BLD AUTO: 4.04 MILLION/UL (ref 3.8–5.1)
SODIUM SERPL-SCNC: 140 MMOL/L (ref 135–146)
T3FREE SERPL-MCNC: 2.4 PG/ML (ref 2.3–4.2)
T4 FREE SERPL-MCNC: 1.2 NG/DL (ref 0.8–1.8)
TSH SERPL-ACNC: 1.93 MIU/L (ref 0.4–4.5)
VIT B12 SERPL-MCNC: >2000 PG/ML (ref 200–1100)
WBC # BLD AUTO: 6.8 THOUSAND/UL (ref 3.8–10.8)

## 2024-06-26 ENCOUNTER — OFFICE VISIT (OUTPATIENT)
Dept: ENDOCRINOLOGY | Facility: HOSPITAL | Age: 59
End: 2024-06-26
Payer: COMMERCIAL

## 2024-06-26 VITALS
HEART RATE: 75 BPM | HEIGHT: 63 IN | SYSTOLIC BLOOD PRESSURE: 114 MMHG | DIASTOLIC BLOOD PRESSURE: 80 MMHG | BODY MASS INDEX: 32.07 KG/M2 | OXYGEN SATURATION: 98 % | WEIGHT: 181 LBS

## 2024-06-26 DIAGNOSIS — E53.8 VITAMIN B12 DEFICIENCY: ICD-10-CM

## 2024-06-26 DIAGNOSIS — E28.2 PCOS (POLYCYSTIC OVARIAN SYNDROME): ICD-10-CM

## 2024-06-26 DIAGNOSIS — E55.9 VITAMIN D DEFICIENCY: ICD-10-CM

## 2024-06-26 DIAGNOSIS — E27.8 LEFT ADRENAL MASS (HCC): ICD-10-CM

## 2024-06-26 DIAGNOSIS — E06.3 HYPOTHYROIDISM DUE TO HASHIMOTO'S THYROIDITIS: Primary | ICD-10-CM

## 2024-06-26 DIAGNOSIS — E03.8 HYPOTHYROIDISM DUE TO HASHIMOTO'S THYROIDITIS: Primary | ICD-10-CM

## 2024-06-26 DIAGNOSIS — E66.9 CLASS 1 OBESITY WITHOUT SERIOUS COMORBIDITY WITH BODY MASS INDEX (BMI) OF 34.0 TO 34.9 IN ADULT, UNSPECIFIED OBESITY TYPE: ICD-10-CM

## 2024-06-26 PROCEDURE — 99214 OFFICE O/P EST MOD 30 MIN: CPT | Performed by: INTERNAL MEDICINE

## 2024-06-26 RX ORDER — FAMOTIDINE 20 MG/1
20 TABLET, FILM COATED ORAL
COMMUNITY

## 2024-06-26 NOTE — PATIENT INSTRUCTIONS
Blood work all good except high vitamin B12.     Use the 2500 units up by taking every other or every 3rd day.     Continue the same metformin and levothyroxine.     We'll try increasing the ozempic to 2 mg once a week.     Continue to diet and exercise.     Follow up in 3-4 months with blood work.

## 2024-06-26 NOTE — PROGRESS NOTES
6/27/2024    Assessment & Plan      Diagnoses and all orders for this visit:    Hypothyroidism due to Hashimoto's thyroiditis  -     T4, free; Future  -     TSH, 3rd generation; Future  -     Comprehensive metabolic panel; Future  -     Vitamin B12; Future  -     T4, free  -     TSH, 3rd generation  -     Comprehensive metabolic panel  -     Vitamin B12    PCOS (polycystic ovarian syndrome)  -     semaglutide, 2 mg/dose, (Ozempic, 2 MG/DOSE,) 8 mg/ mL injection pen; Inject 0.75 mL (2 mg total) under the skin every 7 days  -     T4, free; Future  -     TSH, 3rd generation; Future  -     Comprehensive metabolic panel; Future  -     Vitamin B12; Future  -     T4, free  -     TSH, 3rd generation  -     Comprehensive metabolic panel  -     Vitamin B12    Class 1 obesity without serious comorbidity with body mass index (BMI) of 34.0 to 34.9 in adult, unspecified obesity type  -     semaglutide, 2 mg/dose, (Ozempic, 2 MG/DOSE,) 8 mg/ mL injection pen; Inject 0.75 mL (2 mg total) under the skin every 7 days  -     T4, free; Future  -     TSH, 3rd generation; Future  -     Comprehensive metabolic panel; Future  -     Vitamin B12; Future  -     T4, free  -     TSH, 3rd generation  -     Comprehensive metabolic panel  -     Vitamin B12    Left adrenal mass (HCC)  -     T4, free; Future  -     TSH, 3rd generation; Future  -     Comprehensive metabolic panel; Future  -     Vitamin B12; Future  -     T4, free  -     TSH, 3rd generation  -     Comprehensive metabolic panel  -     Vitamin B12    Vitamin B12 deficiency  -     T4, free; Future  -     TSH, 3rd generation; Future  -     Comprehensive metabolic panel; Future  -     Vitamin B12; Future  -     T4, free  -     TSH, 3rd generation  -     Comprehensive metabolic panel  -     Vitamin B12    Vitamin D deficiency  -     T4, free; Future  -     TSH, 3rd generation; Future  -     Comprehensive metabolic panel; Future  -     Vitamin B12; Future  -     T4, free  -     TSH, 3rd  generation  -     Comprehensive metabolic panel  -     Vitamin B12    Other orders  -     famotidine (PEPCID) 20 mg tablet; Take 20 mg by mouth daily at bedtime        Assessment & Plan  1. Hypothyroidism due to Hashimoto's thyroiditis.  The patient's thyroid function studies are within normal parameters, indicating biochemically euthyroid status. The current treatment plan of levothyroxine 112 mcg, one tablet daily, will be maintained.    2. Polycystic ovary syndrome.  The patient is currently tolerating a daily dose of metformin well and will persist with dietary changes and regular exercise.    3. Obesity.  The patient has been engaged in Weight Watchers for over 6 months, maintaining a regular exercise regimen, and utilizing Ozempic 1 mg once a week. However, her weight loss has plateaued, prompting an increase to Ozempic 2 mg once a week. This adjustment has been implemented.    4. Left adrenal mass.  This mass has been previously ruled out for previous function and will be monitored symptomatically with periodic blood work.    5. Vitamin D deficiency.  The patient's most recent vitamin D level is normal. The patient will maintain her current daily intake of vitamin D 2000 units.    6. Vitamin B12 deficiency.  The patient accidentally received vitamin B12 2500 mcg tablets, which resulted in an elevated vitamin B12 level. The patient has been advised to reduce her vitamin B12 intake to every other or every third day until her current supply is exhausted.    Follow-up  A follow-up visit is scheduled for 3 to 4 months from now, with pre-visit TSH, free T4, CMP, and vitamin B12 level tests to be conducted prior to the visit.        CC: Hypothyroid, PCOS, obesity, adrenal mass, vitamin D deficiency, vitamin B12 deficiency follow-up    History of Present Illness    HPI: Cristina Marin  is a 59-year-old female with a history of hypothyroidism due to Hashimoto's thyroiditis, polycystic ovary syndrome, left adrenal  mass, vitamin D deficiency, and vitamin B12 deficiency. She is here for a follow-up visit.    She was diagnosed with hypothyroidism due to Hashimoto's thyroiditis in 2005 when her weight started to climb despite working out.  She has been on thyroid hormone for replacement purposes ever since.   The patient is currently on a regimen of levothyroxine 112 mcg, one tablet daily, for her hypothyroidism. She reports satisfactory management of her symptoms, albeit with persistent fatigue. Her sleep pattern is generally good, with good and bad days. She denies experiencing diarrhea, constipation, tachycardia, palpitations, or tremors. She does, however, report feeling cold frequently. Her hair, anxiety, and depression have shown improvement. She continues to experience daily choking episodes, particularly when small pieces of food become lodged in her throat.    She has a history of polycystic ovary syndrome diagnosed around 2008.  She had been using oral contraceptives for this problem. She stopped OCP due to age about December 2021.  She is started on metformin  mg once daily in April 2023. Had to decrease to once daily metformin due to GI issues.  She was not losing weight in May 2023 and semaglutide was ordered.   The patient is currently on a regimen of metformin  mg once daily for her polycystic ovary syndrome. She has reduced her dosage to once daily due to severe diarrhea while on Ozempic 1 mg, which she continues to take. She temporarily discontinued Ozempic for approximately six weeks to maintain her weight, but resumed due to abdominal pain. She has been taking over-the-counter Pepcid 20 mg at night. She denies any significant nausea or significant diarrhea. The Ozempic does not suppress her appetite as much as it did previously. She has been participating in Weight Watchers for several years. She engages in regular physical activity, including walking at 5:00 AM. She expresses a desire to lose 20 to  25 pounds.    Supplemental Information  She has restless legs, which has been flaring up. If she sleeps sitting up, it seems to help her. She goes to bed early and gets up early. She still has neuropathy in the second and third toe on the left between them. She denies any sores on the feet. She is on vitamin B12 1000 mcg a day for vitamin B12 deficiency. Her vitamin B12 level is 2500. She denies any numbness or tingling around the mouth.  She takes vitamin D 2000 units daily.        Historical Information   Past Medical History:   Diagnosis Date    Colon polyps     Depression     Exercise-induced asthma     with aggressive work out     Past Surgical History:   Procedure Laterality Date     SECTION      X 2    LAMINECTOMY      L4-5    LAPAROSCOPIC CHOLECYSTECTOMY      TUBAL LIGATION Bilateral      Social History   Social History     Substance and Sexual Activity   Alcohol Use Never     Social History     Substance and Sexual Activity   Drug Use Never     Social History     Tobacco Use   Smoking Status Former    Current packs/day: 0.00    Average packs/day: 1 pack/day for 15.0 years (15.0 ttl pk-yrs)    Types: Cigarettes    Start date: 1989    Quit date: 2004    Years since quittin.5   Smokeless Tobacco Never     Family History:   Family History   Problem Relation Age of Onset    Hypertension Mother     Hypertension Father     Leukemia Father     Diabetes type II Father     Hypothyroidism Father     Colon polyps Sister     Lung disease Sister     Colon polyps Sister     Diabetes type I Son     Tourette syndrome Son     No Known Problems Son        Meds/Allergies   Current Outpatient Medications   Medication Sig Dispense Refill    cholecalciferol (VITAMIN D3) 1,000 units tablet Take 2 tablets (2,000 Units total) by mouth daily      famotidine (PEPCID) 20 mg tablet Take 20 mg by mouth daily at bedtime      FLUoxetine (PROzac) 40 MG capsule Take 40 mg by mouth daily      levothyroxine 112 mcg tablet  "Take 1 tablet (112 mcg total) by mouth daily 90 tablet 2    metFORMIN (GLUCOPHAGE-XR) 500 mg 24 hr tablet TAKE 1 TABLET BY MOUTH TWICE A DAY WITH MEALS 180 tablet 3    ondansetron (ZOFRAN) 4 mg tablet Take 1 tablet (4 mg total) by mouth every 8 (eight) hours as needed for nausea or vomiting 20 tablet 2    semaglutide, 1 mg/dose, (Ozempic, 1 MG/DOSE,) 4 mg/3 mL injection pen INJECT 0.75ML (1MG TOTAL) UNDER THE SKIN EVERY 7 DAYS 3 mL 1    semaglutide, 2 mg/dose, (Ozempic, 2 MG/DOSE,) 8 mg/ mL injection pen Inject 0.75 mL (2 mg total) under the skin every 7 days 3 mL 6    vitamin B-12 (VITAMIN B-12) 1,000 mcg tablet Take by mouth daily       No current facility-administered medications for this visit.     Allergies   Allergen Reactions    Iodine - Food Allergy Vomiting     And sweats     Sulfa Antibiotics Rash       Objective   Vitals: Blood pressure 114/80, pulse 75, height 5' 3\" (1.6 m), weight 82.1 kg (181 lb), SpO2 98%.  Invasive Devices       None                   Physical Exam  Physical exam normal with pertinent positives and negatives.    No lid lag, stare, proptosis, or periorbital edema are present. Negative Chvostek's sign.  Thyroid is normal in size. No palpable thyroid nodules.  Lungs are clear to auscultation.  Heart has a regular rate and rhythm. No murmurs.  No tremor of the outstretched hands. No lower extremity edema.  Neurologic examination shows patellar deep tendon reflexes are normal.      The history was obtained from the review of the chart and from the patient.    Lab Results:      Blood work performed on 6/10/2024 showed a 25-hydroxy vitamin D level of 38.    Vitamin B12 is over 2000.    CBC is normal.    CMP showed a glucose of 69 but was otherwise normal.    Lab Results   Component Value Date    CREATININE 0.70 06/10/2024    CREATININE 0.62 12/12/2023    CREATININE 0.74 09/26/2023    BUN 12 06/10/2024    K 5.2 06/10/2024     06/10/2024    CO2 27 06/10/2024     eGFR   Date Value Ref " Range Status   06/10/2024 100 > OR = 60 mL/min/1.73m2 Final         Lab Results   Component Value Date    ALT 10 06/10/2024    AST 12 06/10/2024    ALKPHOS 108 06/10/2024       Lab Results   Component Value Date    TSH 1.93 06/10/2024    FREET4 1.2 06/10/2024     Free T3 is 2.4.        Future Appointments   Date Time Provider Department Center   10/23/2024  3:20 PM Hannah Montes De Oca MD [facility]

## 2024-07-30 ENCOUNTER — TELEPHONE (OUTPATIENT)
Age: 59
End: 2024-07-30

## 2024-07-30 DIAGNOSIS — E28.2 PCOS (POLYCYSTIC OVARIAN SYNDROME): ICD-10-CM

## 2024-07-30 DIAGNOSIS — E66.9 CLASS 1 OBESITY WITHOUT SERIOUS COMORBIDITY WITH BODY MASS INDEX (BMI) OF 34.0 TO 34.9 IN ADULT, UNSPECIFIED OBESITY TYPE: ICD-10-CM

## 2024-07-30 DIAGNOSIS — E28.2 PCOS (POLYCYSTIC OVARIAN SYNDROME): Primary | ICD-10-CM

## 2024-07-30 RX ORDER — TIRZEPATIDE 2.5 MG/.5ML
2.5 INJECTION, SOLUTION SUBCUTANEOUS WEEKLY
Qty: 2 ML | Refills: 1 | Status: SHIPPED | OUTPATIENT
Start: 2024-07-30 | End: 2024-07-30 | Stop reason: SDUPTHER

## 2024-07-30 RX ORDER — TIRZEPATIDE 2.5 MG/.5ML
2.5 INJECTION, SOLUTION SUBCUTANEOUS WEEKLY
Qty: 2 ML | Refills: 0 | Status: SHIPPED | OUTPATIENT
Start: 2024-07-30 | End: 2024-07-30 | Stop reason: SDUPTHER

## 2024-07-30 RX ORDER — TIRZEPATIDE 2.5 MG/.5ML
2.5 INJECTION, SOLUTION SUBCUTANEOUS WEEKLY
Qty: 2 ML | Refills: 1 | Status: SHIPPED | OUTPATIENT
Start: 2024-07-30 | End: 2024-07-31 | Stop reason: SDUPTHER

## 2024-07-31 ENCOUNTER — TELEPHONE (OUTPATIENT)
Age: 59
End: 2024-07-31

## 2024-07-31 DIAGNOSIS — E28.2 PCOS (POLYCYSTIC OVARIAN SYNDROME): ICD-10-CM

## 2024-07-31 DIAGNOSIS — E66.9 CLASS 1 OBESITY WITHOUT SERIOUS COMORBIDITY WITH BODY MASS INDEX (BMI) OF 34.0 TO 34.9 IN ADULT, UNSPECIFIED OBESITY TYPE: ICD-10-CM

## 2024-07-31 RX ORDER — TIRZEPATIDE 2.5 MG/.5ML
2.5 INJECTION, SOLUTION SUBCUTANEOUS WEEKLY
Qty: 2 ML | Refills: 1 | Status: SHIPPED | OUTPATIENT
Start: 2024-07-31 | End: 2024-08-28

## 2024-07-31 NOTE — TELEPHONE ENCOUNTER
PA for tirzepatide (Zepbound) 2.5 mg/0.5 mL auto-injector  SUBMITTED     via    [][employer]    [x][employer]-Case ID # [id number]  []Faxed to plan   []Other website    []Phone call Case ID #      Office notes sent, clinical questions answered. Awaiting determination    Turnaround time for your insurance to make a decision on your Prior Authorization can take 7-21 business days.

## 2024-07-31 NOTE — TELEPHONE ENCOUNTER
OP Anticoagulation Service Note    Date: 2022    Anticoagulation Summary  As of 2022    INR goal:  2.0-3.0   TTR:  64.4 % (5.7 y)   INR used for dosin.90 (2022)   Warfarin maintenance plan:  4 mg (4 mg x 1) every day   Weekly warfarin total:  28 mg   Plan last modified:  Sean Guzman, PharmD (2022)   Next INR check:  2022   Target end date:  Indefinite    Indications    Status post transcatheter aortic valve replacement (TAVR) using bioprosthesis [Z95.3]  Atrial fibrillation (HCC) [I48.91]  Secondary hypercoagulable state (HCC) [D68.69]             Anticoagulation Episode Summary     INR check location:      Preferred lab:      Send INR reminders to:      Comments:  Pt goes by Dyke  ASA daily      Anticoagulation Care Providers     Provider Role Specialty Phone number    Vickey Rutherford M.D. Referring Cardiovascular Disease (Cardiology) [phone]    [facility] Responsible  [phone]        Anticoagulation Patient Findings  Patient Findings     Negatives:  Signs/symptoms of thrombosis, Signs/symptoms of bleeding, Laboratory test error suspected, Change in health, Change in alcohol use, Change in activity, Upcoming invasive procedure, Emergency department visit, Upcoming dental procedure, Missed doses, Extra doses, Change in medications, Change in diet/appetite, Hospital admission, Bruising, Other complaints          HPI:     Duane Kinsey Iggy is in the Anticoagulation Clinic today.     The reason for today's visit is to prevent morbidity and mortality from a blood clot and/or stroke and to reduce the risk of bleeding while on a anticoagulant.     PCP:  Dyllan Robles M.D.  [address]  [place] [address]  [phone]    Lab Results   Component Value Date/Time    HBA1C 5.5 09/10/2021 08:56 AM      Lab Results   Component Value Date/Time    CHOLSTRLTOT 130 2022 09:07 AM    LDL 60 2022 09:07 AM    HDL 40 2022  PA for tirzepatide (Zepbound) 2.5 mg/0.5 mL auto-injector  Approved     Date(s) approved July 31, 2024 to March 31, 2025     Case #[id number]     Patient advised by          [x] [employer] Message  [] Phone call   []LMOM  []L/M to call office as no active Communication consent on file  []Unable to leave detailed message as VM not approved on Communication consent       Pharmacy advised by    [x]Fax  []Phone call    Approval letter scanned into Media Yes       "09:07 AM    TRIGLYCERIDE 149 03/07/2022 09:07 AM       Lab Results   Component Value Date/Time    GLUCOSE 83 03/07/2022 09:19 AM    BUN 13 03/07/2022 09:19 AM    CREATININE 0.61 03/07/2022 09:19 AM     Lab Results   Component Value Date/Time    ALKPHOSPHAT 111 (H) 03/07/2022 09:19 AM    ASTSGOT 50 (H) 03/07/2022 09:19 AM    ALTSGPT 61 (H) 03/07/2022 09:19 AM    TBILIRUBIN 0.8 03/07/2022 09:19 AM    INR 2.90 07/19/2022 12:00 AM    ALBUMIN 4.1 03/07/2022 09:19 AM      Lab Results   Component Value Date/Time    RBC 5.09 03/07/2022 09:19 AM    HEMOGLOBIN 16.1 03/07/2022 09:19 AM    HEMATOCRIT 48.4 03/07/2022 09:19 AM    PLATELETCT 258 06/18/2020 09:12 AM      Lab Results   Component Value Date/Time    MALBCRT see below 06/20/2017 07:37 AM    MICROALBUR <0.7 06/20/2017 07:37 AM       Current Outpatient Medications:   •  warfarin, Take one tablet daily as directed by [facility]  •  metoprolol SR, 50 mg, Oral, QDAY  •  pravastatin, TAKE 1 TABLET BY MOUTH DAILY  •  digoxin, 250 mcg, Oral, QHS  •  aspirin, 81 mg, Oral, DAILY  •  GLUCOSAMINE HCL PO, 1 Tablet, Oral, BID  •  Vitamin C, Take  by mouth.  •  Coenzyme Q10 (COQ10 PO), Take 300 mg by mouth.  •  fish oil, 2,000 mg, Oral, QDAY with Breakfast  •  lysine, 500 mg, Oral, DAILY  •  acetaminophen, 500-1,000 mg, Oral, Q6HRS PRN  •  gabapentin, 300 mg, Oral, DAILY  •  B Complex Vitamins (VITAMIN B COMPLEX PO), 1 Tablet, Oral, Q EVENING  •  Vitamin D3, 1 Tablet, Oral, Q EVENING  •  multivitamin, 1 Tablet, Oral, QHS    Assessment:     INR  therapeutic.     Is pt on antiplatelet therapy: no    Is pt on NSAID: no    3 vitals included with today's appt-unless patient declined:  (BP, weight, ht, RR)   Vitals:    07/19/22 0838   BP: 123/57   Pulse: 73   Weight: 102 kg (225 lb)   Height: 1.854 m (6' 1\")       Plan:     • Continue the same warfarin dose, as noted above.       Follow-up:     • Test in 6 week(s).    • This decision was made using shared decision " making with the pt and benefits vs risks were discussed.      Additional information discussed with patient:     • Pt educated to contact our clinic with any changes in medications or s/s of bleeding or thrombosis.  • Education was provided today regarding tips to reduce their bleed risk and dietary constraints while on a anticoagulant.    National recommendations regarding anticoagulation therapy:     The CHEST guidelines recommends frequent monitoring at regular intervals for any patient on a anticoagulant, to ensure the patient is on the proper dose, and to monitor that they are not having any complications from therapy.       Sean Guzman, PharmD, MS, BCACP, [facility]  Phone [phone] fax [phone]    This note was created using voice recognition software (Dragon). The accuracy of the dictation is limited by the abilities of the software. I have reviewed the note prior to signing, however some errors in grammar and context are still possible. If you have any questions related to this note please do not hesitate to contact our office.

## 2024-08-05 DIAGNOSIS — E03.8 HYPOTHYROIDISM DUE TO HASHIMOTO'S THYROIDITIS: ICD-10-CM

## 2024-08-05 DIAGNOSIS — E06.3 HYPOTHYROIDISM DUE TO HASHIMOTO'S THYROIDITIS: ICD-10-CM

## 2024-08-06 RX ORDER — LEVOTHYROXINE SODIUM 112 UG/1
112 TABLET ORAL DAILY
Qty: 90 TABLET | Refills: 1 | Status: SHIPPED | OUTPATIENT
Start: 2024-08-06

## 2024-08-20 DIAGNOSIS — E66.9 CLASS 1 OBESITY WITHOUT SERIOUS COMORBIDITY WITH BODY MASS INDEX (BMI) OF 34.0 TO 34.9 IN ADULT, UNSPECIFIED OBESITY TYPE: Primary | ICD-10-CM

## 2024-08-20 DIAGNOSIS — E28.2 PCOS (POLYCYSTIC OVARIAN SYNDROME): ICD-10-CM

## 2024-08-20 RX ORDER — TIRZEPATIDE 5 MG/.5ML
5 INJECTION, SOLUTION SUBCUTANEOUS WEEKLY
Qty: 6 ML | Refills: 0 | Status: SHIPPED | OUTPATIENT
Start: 2024-08-20 | End: 2024-11-06

## 2024-09-05 DIAGNOSIS — E28.2 PCOS (POLYCYSTIC OVARIAN SYNDROME): ICD-10-CM

## 2024-09-06 ENCOUNTER — TELEPHONE (OUTPATIENT)
Age: 59
End: 2024-09-06

## 2024-09-06 DIAGNOSIS — E28.2 PCOS (POLYCYSTIC OVARIAN SYNDROME): ICD-10-CM

## 2024-09-06 RX ORDER — METFORMIN HCL 500 MG
500 TABLET, EXTENDED RELEASE 24 HR ORAL 2 TIMES DAILY WITH MEALS
Qty: 180 TABLET | Refills: 1 | Status: SHIPPED | OUTPATIENT
Start: 2024-09-06

## 2024-09-06 RX ORDER — METFORMIN HCL 500 MG
500 TABLET, EXTENDED RELEASE 24 HR ORAL 2 TIMES DAILY WITH MEALS
Qty: 60 TABLET | Refills: 0 | Status: SHIPPED | OUTPATIENT
Start: 2024-09-06 | End: 2024-09-06 | Stop reason: SDUPTHER

## 2024-09-06 NOTE — TELEPHONE ENCOUNTER
Pharmacy calling because patient usually gets a 90 day supply for the metformin and was sent a 1 month supply. They want to know if the Dr wants to do the 90 day supply?

## 2024-09-06 NOTE — TELEPHONE ENCOUNTER
Patient needs updated blood work. Please place orders. A courtesy refill was provided.     A1C needed

## 2024-09-20 DIAGNOSIS — E66.9 CLASS 1 OBESITY WITHOUT SERIOUS COMORBIDITY WITH BODY MASS INDEX (BMI) OF 34.0 TO 34.9 IN ADULT, UNSPECIFIED OBESITY TYPE: Primary | ICD-10-CM

## 2024-09-20 RX ORDER — TIRZEPATIDE 7.5 MG/.5ML
7.5 INJECTION, SOLUTION SUBCUTANEOUS WEEKLY
Qty: 2 ML | Refills: 3 | Status: SHIPPED | OUTPATIENT
Start: 2024-09-20

## 2024-10-01 LAB
ALBUMIN SERPL-MCNC: 4 G/DL (ref 3.6–5.1)
ALBUMIN/GLOB SERPL: 1.6 (CALC) (ref 1–2.5)
ALP SERPL-CCNC: 103 U/L (ref 37–153)
ALT SERPL-CCNC: 10 U/L (ref 6–29)
AST SERPL-CCNC: 14 U/L (ref 10–35)
BILIRUB SERPL-MCNC: 0.3 MG/DL (ref 0.2–1.2)
BUN SERPL-MCNC: 13 MG/DL (ref 7–25)
BUN/CREAT SERPL: NORMAL (CALC) (ref 6–22)
CALCIUM SERPL-MCNC: 9.5 MG/DL (ref 8.6–10.4)
CHLORIDE SERPL-SCNC: 105 MMOL/L (ref 98–110)
CO2 SERPL-SCNC: 28 MMOL/L (ref 20–32)
CREAT SERPL-MCNC: 0.71 MG/DL (ref 0.5–1.03)
GFR/BSA.PRED SERPLBLD CYS-BASED-ARV: 98 ML/MIN/1.73M2
GLOBULIN SER CALC-MCNC: 2.5 G/DL (CALC) (ref 1.9–3.7)
GLUCOSE SERPL-MCNC: 77 MG/DL (ref 65–99)
POTASSIUM SERPL-SCNC: 4.7 MMOL/L (ref 3.5–5.3)
PROT SERPL-MCNC: 6.5 G/DL (ref 6.1–8.1)
SODIUM SERPL-SCNC: 140 MMOL/L (ref 135–146)
T4 FREE SERPL-MCNC: 1.3 NG/DL (ref 0.8–1.8)
TSH SERPL-ACNC: 2.14 MIU/L (ref 0.4–4.5)
VIT B12 SERPL-MCNC: 763 PG/ML (ref 200–1100)

## 2024-10-07 ENCOUNTER — OFFICE VISIT (OUTPATIENT)
Dept: ENDOCRINOLOGY | Facility: HOSPITAL | Age: 59
End: 2024-10-07
Payer: COMMERCIAL

## 2024-10-07 VITALS
DIASTOLIC BLOOD PRESSURE: 80 MMHG | SYSTOLIC BLOOD PRESSURE: 124 MMHG | HEART RATE: 64 BPM | BODY MASS INDEX: 31.71 KG/M2 | WEIGHT: 179 LBS | HEIGHT: 63 IN | OXYGEN SATURATION: 99 %

## 2024-10-07 DIAGNOSIS — E06.3 HYPOTHYROIDISM DUE TO HASHIMOTO'S THYROIDITIS: Primary | ICD-10-CM

## 2024-10-07 DIAGNOSIS — E66.811 CLASS 1 OBESITY WITHOUT SERIOUS COMORBIDITY WITH BODY MASS INDEX (BMI) OF 34.0 TO 34.9 IN ADULT, UNSPECIFIED OBESITY TYPE: ICD-10-CM

## 2024-10-07 DIAGNOSIS — E55.9 VITAMIN D DEFICIENCY: ICD-10-CM

## 2024-10-07 DIAGNOSIS — E28.2 PCOS (POLYCYSTIC OVARIAN SYNDROME): ICD-10-CM

## 2024-10-07 DIAGNOSIS — E27.8 LEFT ADRENAL MASS (HCC): ICD-10-CM

## 2024-10-07 DIAGNOSIS — E53.8 VITAMIN B12 DEFICIENCY: ICD-10-CM

## 2024-10-07 PROCEDURE — 99214 OFFICE O/P EST MOD 30 MIN: CPT | Performed by: INTERNAL MEDICINE

## 2024-10-07 NOTE — PATIENT INSTRUCTIONS
The blood work is all good.     Continue the same levothyroxine, vitamin D and Vitamin B 12.     Continue the zepbound 7.5 mg once a week.     You can try increasing the metformin to 2 daily to see if that helps the constipation.     If the abdominal issues do not improve, let me know.    Follow up in 6 months with blood work.

## 2024-10-07 NOTE — PROGRESS NOTES
10/7/2024    Assessment & Plan      Diagnoses and all orders for this visit:    Hypothyroidism due to Hashimoto's thyroiditis  -     T4, free; Future  -     TSH, 3rd generation; Future  -     Comprehensive metabolic panel; Future  -     CBC and differential; Future  -     T4, free  -     TSH, 3rd generation  -     Comprehensive metabolic panel  -     CBC and differential    PCOS (polycystic ovarian syndrome)  -     T4, free; Future  -     TSH, 3rd generation; Future  -     Comprehensive metabolic panel; Future  -     CBC and differential; Future  -     T4, free  -     TSH, 3rd generation  -     Comprehensive metabolic panel  -     CBC and differential    Left adrenal mass (HCC)  -     T4, free; Future  -     TSH, 3rd generation; Future  -     Comprehensive metabolic panel; Future  -     CBC and differential; Future  -     T4, free  -     TSH, 3rd generation  -     Comprehensive metabolic panel  -     CBC and differential    Class 1 obesity without serious comorbidity with body mass index (BMI) of 34.0 to 34.9 in adult, unspecified obesity type  -     T4, free; Future  -     TSH, 3rd generation; Future  -     Comprehensive metabolic panel; Future  -     CBC and differential; Future  -     T4, free  -     TSH, 3rd generation  -     Comprehensive metabolic panel  -     CBC and differential    Vitamin B12 deficiency  -     T4, free; Future  -     TSH, 3rd generation; Future  -     Comprehensive metabolic panel; Future  -     CBC and differential; Future  -     T4, free  -     TSH, 3rd generation  -     Comprehensive metabolic panel  -     CBC and differential    Vitamin D deficiency  -     T4, free; Future  -     TSH, 3rd generation; Future  -     Comprehensive metabolic panel; Future  -     CBC and differential; Future  -     T4, free  -     TSH, 3rd generation  -     Comprehensive metabolic panel  -     CBC and differential        Assessment & Plan  1. Hypothyroidism due to Hashimoto's thyroiditis.  Most recent  thyroid function studies are normal. She is biochemically and clinically euthyroid. She will continue the same levothyroxine 112 mcg daily.    2. Polycystic ovary syndrome.  She has been struggling to lose weight and utilizes metformin  mg once daily. She could use twice daily given she is having some issues with constipation which could be offset by utilizing more metformin.    3. Left adrenal mass.  She has no symptoms referable to hyperfunction of an adrenal mass.    4. Obesity.  She is utilizing Zepbound 7.5 mg once a week. She will continue this dosage and keep an eye on how her abdomen feels. If it does not improve, she is to let me know.    5. Vitamin B12 deficiency.  Most recent vitamin B12 level has normalized now that she is on vitamin B12 1000 mcg daily instead of 2500 mcg.    6. Vitamin D deficiency.  She will continue the same vitamin D2 1000 units daily.    Follow-up  Return in 6 months for follow up with preceding CBC, CMP, TSH, and free T4.        CC: Hypothyroid, PCOS, adrenal, vitamin D deficiency, vitamin B12 deficiency, obesity follow-up      History of Present Illness    HPI: Cristina Marin is a 59-year-old female with a history of hypothyroidism due to Hashimoto's thyroiditis, polycystic ovary syndrome, obesity, left adrenal mass, vitamin D deficiency, and vitamin B12 deficiency, here for a follow-up visit.    She was diagnosed with hypothyroidism due to Hashimoto's thyroiditis in 2005 when her weight started to climb despite working out. She is on thyroid hormone for replacement purposes.     She has a history of a left adrenal mass noted on a CT scan in 11/2015. This was consistent with a benign adenoma. Overnight dexamethasone suppression test and catecholamines were normal in the summer of 2021 ruling out hyperfunctioning of her adrenal mass.     She has a history of polycystic ovary syndrome diagnosed around 2008. She has been using oral contraceptives for this problem. When she  stopped them in 12/2021 and started metformin in 04/2023, but had to decrease it to once daily due to GI issues. Semaglutide was ordered in 05/2023 when she was not losing weight appropriately and this was done in 05/2023.  She was switched from semaglutide to Zepbound over the summer 2024 for improved appetite suppression.    She is taking levothyroxine 112 mcg daily for her hypothyroidism.  She reports feeling well overall but experiences significant fatigue, which she attributes to her thyroid condition. Her sleep pattern is inconsistent, with some nights of good sleep and others where she struggles to fall asleep or stay asleep. She reports no hot flashes, excessive sweating, cold intolerance, heart palpitations, tremors, or shaky hands. She has a history of constipation, dry skin, and brittle nails. Additionally, she reports hair loss, particularly on the left temple and back of her head. She has been experiencing difficulty swallowing for several years.    She is currently taking metformin  mg daily and recently increased her Ozempic dosage to 7.5 mg, which has resulted in constipation and stomach pain. She previously experienced similar symptoms when adjusting her Ozempic dosage. She took a break from Ozempic while traveling in [place] to avoid potential illness. She reports occasional sharp epigastric and lower abdominal pain, mild nausea, and frequent urination due to high water intake. She also reports blurry vision and double vision, for which she uses corrective glasses.    She is working on improving her diet and plans to resume exercising. She reports no numbness, tingling, muscle twitches, or spasms. Her current medications include  vitamin D2 1000 units daily, and vitamin B12 1000 mcg daily.        Historical Information   Past Medical History:   Diagnosis Date    Colon polyps     Depression     Exercise-induced asthma     with aggressive work out     Past Surgical History:   Procedure Laterality  Date     SECTION      X 2    LAMINECTOMY      L4-5    LAPAROSCOPIC CHOLECYSTECTOMY      TUBAL LIGATION Bilateral      Social History   Social History     Substance and Sexual Activity   Alcohol Use Never     Social History     Substance and Sexual Activity   Drug Use Never     Social History     Tobacco Use   Smoking Status Former    Current packs/day: 0.00    Average packs/day: 1 pack/day for 15.0 years (15.0 ttl pk-yrs)    Types: Cigarettes    Start date: 1989    Quit date: 2004    Years since quittin.7   Smokeless Tobacco Never     Family History:   Family History   Problem Relation Age of Onset    Hypertension Mother     Hypertension Father     Leukemia Father     Diabetes type II Father     Hypothyroidism Father     Colon polyps Sister     Lung disease Sister     Colon polyps Sister     Diabetes type I Son     Tourette syndrome Son     No Known Problems Son        Meds/Allergies   Current Outpatient Medications   Medication Sig Dispense Refill    cholecalciferol (VITAMIN D3) 1,000 units tablet Take 2 tablets (2,000 Units total) by mouth daily      famotidine (PEPCID) 20 mg tablet Take 20 mg by mouth daily at bedtime      FLUoxetine (PROzac) 40 MG capsule Take 40 mg by mouth daily      levothyroxine 112 mcg tablet TAKE 1 TABLET (112 MCG TOTAL) BY MOUTH DAILY 90 tablet 1    metFORMIN (GLUCOPHAGE-XR) 500 mg 24 hr tablet Take 1 tablet (500 mg total) by mouth 2 (two) times a day with meals 180 tablet 1    ondansetron (ZOFRAN) 4 mg tablet Take 1 tablet (4 mg total) by mouth every 8 (eight) hours as needed for nausea or vomiting 20 tablet 2    tirzepatide (Zepbound) 7.5 mg/0.5 mL auto-injector Inject 0.5 mL (7.5 mg total) under the skin once a week 2 mL 3    vitamin B-12 (VITAMIN B-12) 1,000 mcg tablet Take by mouth daily      tirzepatide (Zepbound) 5 mg/0.5 mL auto-injector Inject 0.5 mL (5 mg total) under the skin once a week for 12 doses (Patient not taking: Reported on 10/7/2024) 6 mL 0     No  "current facility-administered medications for this visit.     Allergies   Allergen Reactions    Iodine - Food Allergy Vomiting     And sweats     Sulfa Antibiotics Rash       Objective   Vitals: Blood pressure 124/80, pulse 64, height 5' 3\" (1.6 m), weight 81.2 kg (179 lb), SpO2 99%.  Invasive Devices       None                   Physical Exam    No lid lag, stare, proptosis, or periorbital edema in the eyes. Negative [place] sign.  Thyroid is normal in size. No palpable thyroid nodules. No lymphadenopathy in the neck.  Lungs are clear to auscultation.  Heart has a regular rate and rhythm. No murmurs.  No tremor in the outstretched hands. Patellar deep tendon reflexes are normal.    Vital Signs  Weight is 179.      The history was obtained from the review of the chart and from the patient.    Lab Results:      Blood work performed on 9/30/2024 showed a vitamin B12 level of 763.    CMP showed glucose of 77 fasting but was otherwise normal.    TSH is 2.14 with a free T4 of 1.3.    Lab Results   Component Value Date    CREATININE 0.71 09/30/2024    CREATININE 0.70 06/10/2024    CREATININE 0.62 12/12/2023    BUN 13 09/30/2024    K 4.7 09/30/2024     09/30/2024    CO2 28 09/30/2024     eGFR   Date Value Ref Range Status   09/30/2024 98 > OR = 60 mL/min/1.73m2 Final         Lab Results   Component Value Date    ALT 10 09/30/2024    AST 14 09/30/2024    ALKPHOS 103 09/30/2024       Lab Results   Component Value Date    TSH 2.14 09/30/2024    FREET4 1.3 09/30/2024             Future Appointments   Date Time Provider Department Center   4/7/2025  3:20 PM Hannah Montes De Oca MD [facility]     "

## 2024-12-02 DIAGNOSIS — R11.0 NAUSEA: ICD-10-CM

## 2024-12-02 DIAGNOSIS — E28.2 PCOS (POLYCYSTIC OVARIAN SYNDROME): ICD-10-CM

## 2024-12-03 RX ORDER — ONDANSETRON 4 MG/1
TABLET, FILM COATED ORAL
Qty: 20 TABLET | Refills: 2 | Status: SHIPPED | OUTPATIENT
Start: 2024-12-03

## 2025-01-16 DIAGNOSIS — E66.811 CLASS 1 OBESITY WITHOUT SERIOUS COMORBIDITY WITH BODY MASS INDEX (BMI) OF 34.0 TO 34.9 IN ADULT, UNSPECIFIED OBESITY TYPE: Primary | ICD-10-CM

## 2025-01-16 RX ORDER — TIRZEPATIDE 5 MG/.5ML
5 INJECTION, SOLUTION SUBCUTANEOUS WEEKLY
Qty: 6 ML | Refills: 0 | Status: SHIPPED | OUTPATIENT
Start: 2025-01-16 | End: 2025-04-04

## 2025-03-18 ENCOUNTER — HOSPITAL ENCOUNTER (OUTPATIENT)
Dept: HOSPITAL 99 - WDC | Age: 60
End: 2025-03-18
Payer: COMMERCIAL

## 2025-03-18 DIAGNOSIS — Z12.31: Primary | ICD-10-CM

## 2025-04-03 ENCOUNTER — RESULTS FOLLOW-UP (OUTPATIENT)
Dept: ENDOCRINOLOGY | Facility: HOSPITAL | Age: 60
End: 2025-04-03

## 2025-04-03 LAB
ALBUMIN SERPL-MCNC: 4.1 G/DL (ref 3.6–5.1)
ALBUMIN/GLOB SERPL: 1.5 (CALC) (ref 1–2.5)
ALP SERPL-CCNC: 91 U/L (ref 37–153)
ALT SERPL-CCNC: 13 U/L (ref 6–29)
AST SERPL-CCNC: 14 U/L (ref 10–35)
BASOPHILS # BLD AUTO: 40 CELLS/UL (ref 0–200)
BASOPHILS NFR BLD AUTO: 0.6 %
BILIRUB SERPL-MCNC: 0.3 MG/DL (ref 0.2–1.2)
BUN SERPL-MCNC: 15 MG/DL (ref 7–25)
BUN/CREAT SERPL: NORMAL (CALC) (ref 6–22)
CALCIUM SERPL-MCNC: 9.5 MG/DL (ref 8.6–10.4)
CHLORIDE SERPL-SCNC: 105 MMOL/L (ref 98–110)
CO2 SERPL-SCNC: 27 MMOL/L (ref 20–32)
CREAT SERPL-MCNC: 0.71 MG/DL (ref 0.5–1.03)
EOSINOPHIL # BLD AUTO: 121 CELLS/UL (ref 15–500)
EOSINOPHIL NFR BLD AUTO: 1.8 %
ERYTHROCYTE [DISTWIDTH] IN BLOOD BY AUTOMATED COUNT: 12.1 % (ref 11–15)
GFR/BSA.PRED SERPLBLD CYS-BASED-ARV: 98 ML/MIN/1.73M2
GLOBULIN SER CALC-MCNC: 2.8 G/DL (CALC) (ref 1.9–3.7)
GLUCOSE SERPL-MCNC: 89 MG/DL (ref 65–99)
HCT VFR BLD AUTO: 40.8 % (ref 35–45)
HGB BLD-MCNC: 13.1 G/DL (ref 11.7–15.5)
LYMPHOCYTES # BLD AUTO: 2399 CELLS/UL (ref 850–3900)
LYMPHOCYTES NFR BLD AUTO: 35.8 %
MCH RBC QN AUTO: 31.1 PG (ref 27–33)
MCHC RBC AUTO-ENTMCNC: 32.1 G/DL (ref 32–36)
MCV RBC AUTO: 96.9 FL (ref 80–100)
MONOCYTES # BLD AUTO: 409 CELLS/UL (ref 200–950)
MONOCYTES NFR BLD AUTO: 6.1 %
NEUTROPHILS # BLD AUTO: 3732 CELLS/UL (ref 1500–7800)
NEUTROPHILS NFR BLD AUTO: 55.7 %
PLATELET # BLD AUTO: 345 THOUSAND/UL (ref 140–400)
PMV BLD REES-ECKER: 10.4 FL (ref 7.5–12.5)
POTASSIUM SERPL-SCNC: 5.3 MMOL/L (ref 3.5–5.3)
PROT SERPL-MCNC: 6.9 G/DL (ref 6.1–8.1)
RBC # BLD AUTO: 4.21 MILLION/UL (ref 3.8–5.1)
SODIUM SERPL-SCNC: 141 MMOL/L (ref 135–146)
T4 FREE SERPL-MCNC: 1.4 NG/DL (ref 0.8–1.8)
TSH SERPL-ACNC: 3.66 MIU/L (ref 0.4–4.5)
WBC # BLD AUTO: 6.7 THOUSAND/UL (ref 3.8–10.8)

## 2025-04-08 ENCOUNTER — OFFICE VISIT (OUTPATIENT)
Dept: ENDOCRINOLOGY | Facility: HOSPITAL | Age: 60
End: 2025-04-08
Payer: COMMERCIAL

## 2025-04-08 VITALS
HEART RATE: 68 BPM | HEIGHT: 63 IN | BODY MASS INDEX: 31.89 KG/M2 | DIASTOLIC BLOOD PRESSURE: 72 MMHG | SYSTOLIC BLOOD PRESSURE: 128 MMHG | WEIGHT: 180 LBS | OXYGEN SATURATION: 99 %

## 2025-04-08 DIAGNOSIS — E27.8 LEFT ADRENAL MASS (HCC): ICD-10-CM

## 2025-04-08 DIAGNOSIS — E55.9 VITAMIN D DEFICIENCY: ICD-10-CM

## 2025-04-08 DIAGNOSIS — E53.8 VITAMIN B12 DEFICIENCY: ICD-10-CM

## 2025-04-08 DIAGNOSIS — E06.3 HYPOTHYROIDISM DUE TO HASHIMOTO'S THYROIDITIS: Primary | ICD-10-CM

## 2025-04-08 DIAGNOSIS — E66.811 CLASS 1 OBESITY WITHOUT SERIOUS COMORBIDITY WITH BODY MASS INDEX (BMI) OF 34.0 TO 34.9 IN ADULT, UNSPECIFIED OBESITY TYPE: ICD-10-CM

## 2025-04-08 DIAGNOSIS — E28.2 PCOS (POLYCYSTIC OVARIAN SYNDROME): ICD-10-CM

## 2025-04-08 PROCEDURE — 99214 OFFICE O/P EST MOD 30 MIN: CPT | Performed by: INTERNAL MEDICINE

## 2025-04-08 RX ORDER — TIRZEPATIDE 5 MG/.5ML
5 INJECTION, SOLUTION SUBCUTANEOUS WEEKLY
COMMUNITY

## 2025-04-08 RX ORDER — TIRZEPATIDE 7.5 MG/.5ML
7.5 INJECTION, SOLUTION SUBCUTANEOUS WEEKLY
Qty: 2 ML | Refills: 6 | Status: SHIPPED | OUTPATIENT
Start: 2025-04-08 | End: 2025-10-15

## 2025-04-08 NOTE — PROGRESS NOTES
4/9/2025    Assessment & Plan      Diagnoses and all orders for this visit:    Hypothyroidism due to Hashimoto's thyroiditis  -     Comprehensive metabolic panel; Future  -     TSH, 3rd generation; Future  -     Vitamin D 25 hydroxy; Future  -     Vitamin B12; Future  -     T4, free; Future  -     CBC and differential; Future  -     Hemoglobin A1c (w/out EAG); Future  -     Comprehensive metabolic panel  -     TSH, 3rd generation  -     Vitamin D 25 hydroxy  -     Vitamin B12  -     T4, free  -     CBC and differential  -     Hemoglobin A1c (w/out EAG)    PCOS (polycystic ovarian syndrome)  -     Comprehensive metabolic panel; Future  -     TSH, 3rd generation; Future  -     Vitamin D 25 hydroxy; Future  -     Vitamin B12; Future  -     T4, free; Future  -     CBC and differential; Future  -     Hemoglobin A1c (w/out EAG); Future  -     Comprehensive metabolic panel  -     TSH, 3rd generation  -     Vitamin D 25 hydroxy  -     Vitamin B12  -     T4, free  -     CBC and differential  -     Hemoglobin A1c (w/out EAG)    Class 1 obesity without serious comorbidity with body mass index (BMI) of 34.0 to 34.9 in adult, unspecified obesity type  -     tirzepatide (Zepbound) 7.5 mg/0.5 mL auto-injector; Inject 0.5 mL (7.5 mg total) under the skin once a week for 28 doses  -     Comprehensive metabolic panel; Future  -     TSH, 3rd generation; Future  -     Vitamin D 25 hydroxy; Future  -     Vitamin B12; Future  -     T4, free; Future  -     CBC and differential; Future  -     Hemoglobin A1c (w/out EAG); Future  -     Comprehensive metabolic panel  -     TSH, 3rd generation  -     Vitamin D 25 hydroxy  -     Vitamin B12  -     T4, free  -     CBC and differential  -     Hemoglobin A1c (w/out EAG)    Left adrenal mass (HCC)  -     Comprehensive metabolic panel; Future  -     TSH, 3rd generation; Future  -     Vitamin D 25 hydroxy; Future  -     Vitamin B12; Future  -     T4, free; Future  -     CBC and differential;  Future  -     Hemoglobin A1c (w/out EAG); Future  -     Comprehensive metabolic panel  -     TSH, 3rd generation  -     Vitamin D 25 hydroxy  -     Vitamin B12  -     T4, free  -     CBC and differential  -     Hemoglobin A1c (w/out EAG)    Vitamin B12 deficiency  -     Comprehensive metabolic panel; Future  -     TSH, 3rd generation; Future  -     Vitamin D 25 hydroxy; Future  -     Vitamin B12; Future  -     T4, free; Future  -     CBC and differential; Future  -     Hemoglobin A1c (w/out EAG); Future  -     Comprehensive metabolic panel  -     TSH, 3rd generation  -     Vitamin D 25 hydroxy  -     Vitamin B12  -     T4, free  -     CBC and differential  -     Hemoglobin A1c (w/out EAG)    Vitamin D deficiency  -     Comprehensive metabolic panel; Future  -     TSH, 3rd generation; Future  -     Vitamin D 25 hydroxy; Future  -     Vitamin B12; Future  -     T4, free; Future  -     CBC and differential; Future  -     Hemoglobin A1c (w/out EAG); Future  -     Comprehensive metabolic panel  -     TSH, 3rd generation  -     Vitamin D 25 hydroxy  -     Vitamin B12  -     T4, free  -     CBC and differential  -     Hemoglobin A1c (w/out EAG)    Other orders  -     tirzepatide (Zepbound) 5 mg/0.5 mL auto-injector; Inject 5 mg under the skin once a week        Assessment & Plan  1. Hypothyroidism due to Hashimoto's thyroiditis.  She was diagnosed with hypothyroidism due to Hashimoto's thyroiditis in 2005. She is currently on levothyroxine 112 mcg daily. She reports no symptoms such as feeling excessively hot or cold, heart racing, palpitations, tremors, or shaky hands. She will continue the current levothyroxine dosage.    2. Polycystic ovary syndrome (PCOS).  She has a history of PCOS diagnosed around 2008. She is currently taking metformin  mg once daily. She will continue this regimen.    3. Left adrenal mass.  She has a history of a left adrenal mass noted on CT in November 2015, consistent with a benign  adenoma. An overnight dexamethasone suppression test and catecholamines were normal in the summer of 2021, ruling out hyperfunctioning of her adrenal mass.    4.  Obesity.  She is currently on Zepbound 5 mg but reports gaining weight and not experiencing significant appetite suppression. She will increase the Zepbound dosage to 7.5 mg once a week. If she tolerates the 7.5 mg dose well after a month, the dosage may be increased to 10 mg. If the maximum dose is not effective, a tapering off plan will be considered.    5. Vitamin D deficiency.  She is currently taking vitamin D 2000 units daily. She will continue this regimen.    6. Vitamin B12 deficiency.  She is currently taking vitamin B12 1000 mcg daily. She will continue this regimen.    I have asked her to follow-up in 6 months with preceding CMP, hemoglobin A1c, CBC, TSH, free T4, vitamin B12 level, and 25-hydroxy vitamin D level.        CC: Hypothyroid, PCOS, adrenal, vitamin D deficiency, vitamin B12 deficiency follow-up    History of Present Illness    HPI: Cristina Marin is a 59-year-old female with a history of hypothyroidism due to Hashimoto's thyroiditis, polycystic ovary syndrome, left adrenal mass, vitamin D deficiency, and vitamin B12 deficiency, here for a follow-up visit.    She was diagnosed with hypothyroidism due to Hashimoto's thyroiditis in 2005 when her weight started to climb despite working out. She reports no excessive heat or cold sensations, palpitations, tremors, or shaky hands. She does experience constipation, which she attributes to Zepbound. She also reports dry skin and hair loss but no significant nail breakage. She is on thyroid hormone for replacement purposes and takes levothyroxine 112 mcg daily.    She has a history of a left adrenal mass noted on CT in November 2015, consistent with a benign adenoma. An overnight dexamethasone suppression test and catecholamines were normal in the summer of 2021, ruling out hyperfunctioning  of her adrenal mass.    She has a history of polycystic ovary syndrome diagnosed around . She had been using oral contraceptives for this problem until 2021 and started metformin in 2023. She is currently on metformin  mg once daily for PCOS.    She reports weight gain despite being on Zepbound 5 mg. She experienced nausea and 2 episodes of vomiting when previously on Zepbound 7.5 mg, which she believes was due to excessive soda consumption and inadequate protein intake. She has since reduced her soda intake and increased her protein consumption, which has improved her stomach discomfort. She reports no significant reflux symptoms or heartburn. The Zepbound 5 mg does not significantly suppress her appetite or food noise. She switched from Ozempic to Zepbound due to side effects from the former. She has made dietary modifications, including reducing soda intake and increasing protein consumption. She has also initiated a walking exercise regimen.    She is on vitamin D 2000 units a day for vitamin D deficiency and vitamin B12 1000 mcg a day for vitamin B12 deficiency.        Historical Information   Past Medical History:   Diagnosis Date    Colon polyps     Depression     Exercise-induced asthma     with aggressive work out     Past Surgical History:   Procedure Laterality Date     SECTION      X 2    LAMINECTOMY      L4-5    LAPAROSCOPIC CHOLECYSTECTOMY      TUBAL LIGATION Bilateral      Social History   Social History     Substance and Sexual Activity   Alcohol Use Never     Social History     Substance and Sexual Activity   Drug Use Never     Social History     Tobacco Use   Smoking Status Former    Current packs/day: 0.00    Average packs/day: 1 pack/day for 15.0 years (15.0 ttl pk-yrs)    Types: Cigarettes    Start date: 1989    Quit date: 2004    Years since quittin.2   Smokeless Tobacco Never     Family History:   Family History   Problem Relation Age of Onset     "Hypertension Mother     Hypertension Father     Leukemia Father     Diabetes type II Father     Hypothyroidism Father     Colon polyps Sister     Lung disease Sister     Colon polyps Sister     Diabetes type I Son     Tourette syndrome Son     No Known Problems Son        Meds/Allergies   Current Outpatient Medications   Medication Sig Dispense Refill    cholecalciferol (VITAMIN D3) 1,000 units tablet Take 2 tablets (2,000 Units total) by mouth daily      famotidine (PEPCID) 20 mg tablet Take 20 mg by mouth daily at bedtime      FLUoxetine (PROzac) 40 MG capsule Take 40 mg by mouth daily      levothyroxine 112 mcg tablet TAKE 1 TABLET (112 MCG TOTAL) BY MOUTH DAILY 90 tablet 1    metFORMIN (GLUCOPHAGE-XR) 500 mg 24 hr tablet Take 1 tablet (500 mg total) by mouth 2 (two) times a day with meals (Patient taking differently: Take 500 mg by mouth daily with breakfast) 180 tablet 1    ondansetron (ZOFRAN) 4 mg tablet TAKE ONE TABLET BY MOUTH EVERY 8 HOURS AS NEEDED FOR NAUSEA AND VOMITTING 20 tablet 2    tirzepatide (Zepbound) 5 mg/0.5 mL auto-injector Inject 5 mg under the skin once a week      tirzepatide (Zepbound) 7.5 mg/0.5 mL auto-injector Inject 0.5 mL (7.5 mg total) under the skin once a week for 28 doses 2 mL 6    vitamin B-12 (VITAMIN B-12) 1,000 mcg tablet Take by mouth daily       No current facility-administered medications for this visit.     Allergies   Allergen Reactions    Iodine - Food Allergy Vomiting     And sweats     Sulfa Antibiotics Rash       Objective   Vitals: Blood pressure 128/72, pulse 68, height 5' 3\" (1.6 m), weight 81.6 kg (180 lb), SpO2 99%.  Invasive Devices       None                   Physical Exam    No lid lag, stare, proptosis or periorbital edema in the eyes.  Thyroid is normal in size. No palpable thyroid nodules.  Lungs are clear to auscultation.  Heart has a regular rate and rhythm. No murmurs.  No tremor of the outstretched hands. Patellar deep tendon reflexes are " normal.      The history was obtained from the review of the chart and from the patient.    Lab Results:   Blood work performed on 4/2/2025 showed a normal CBC.    CMP showed glucose of 89 fasting but was otherwise normal.    Lab Results   Component Value Date    CREATININE 0.71 04/02/2025    CREATININE 0.71 09/30/2024    CREATININE 0.70 06/10/2024    BUN 15 04/02/2025    K 5.3 04/02/2025     04/02/2025    CO2 27 04/02/2025     eGFR   Date Value Ref Range Status   04/02/2025 98 > OR = 60 mL/min/1.73m2 Final       Lab Results   Component Value Date    ALT 13 04/02/2025    AST 14 04/02/2025    ALKPHOS 91 04/02/2025       Lab Results   Component Value Date    TSH 3.66 04/02/2025    FREET4 1.4 04/02/2025             Future Appointments   Date Time Provider Department Center   10/15/2025  4:00 PM Hannah Montes De Oca MD [facility]

## 2025-04-08 NOTE — PATIENT INSTRUCTIONS
The blood work is normal at present.     Continue the same levothyroxine 112 mcg daily and metformin daily.     We'll increase the zepbound to 7.,5 mg once a week.     Follow up in 6 months with adama harris.

## 2025-05-02 DIAGNOSIS — E06.3 HYPOTHYROIDISM DUE TO HASHIMOTO'S THYROIDITIS: ICD-10-CM

## 2025-05-02 RX ORDER — LEVOTHYROXINE SODIUM 112 UG/1
112 TABLET ORAL DAILY
Qty: 90 TABLET | Refills: 1 | Status: SHIPPED | OUTPATIENT
Start: 2025-05-02

## 2025-06-11 DIAGNOSIS — E66.811 CLASS 1 OBESITY WITHOUT SERIOUS COMORBIDITY WITH BODY MASS INDEX (BMI) OF 34.0 TO 34.9 IN ADULT, UNSPECIFIED OBESITY TYPE: Primary | ICD-10-CM

## 2025-06-11 DIAGNOSIS — E28.2 PCOS (POLYCYSTIC OVARIAN SYNDROME): ICD-10-CM

## 2025-06-11 DIAGNOSIS — R11.0 NAUSEA: ICD-10-CM

## 2025-06-11 RX ORDER — ONDANSETRON 4 MG/1
4 TABLET, FILM COATED ORAL EVERY 8 HOURS PRN
Qty: 20 TABLET | Refills: 2 | Status: SHIPPED | OUTPATIENT
Start: 2025-06-11